# Patient Record
Sex: FEMALE | Race: WHITE | NOT HISPANIC OR LATINO | URBAN - METROPOLITAN AREA
[De-identification: names, ages, dates, MRNs, and addresses within clinical notes are randomized per-mention and may not be internally consistent; named-entity substitution may affect disease eponyms.]

---

## 2020-04-24 ENCOUNTER — LAB VISIT (OUTPATIENT)
Dept: LAB | Facility: HOSPITAL | Age: 85
End: 2020-04-24
Payer: MEDICARE

## 2020-04-24 DIAGNOSIS — Z20.822 SUSPECTED COVID-19 VIRUS INFECTION: ICD-10-CM

## 2020-04-24 PROCEDURE — U0002 COVID-19 LAB TEST NON-CDC: HCPCS

## 2020-04-25 LAB — SARS-COV-2 RNA RESP QL NAA+PROBE: DETECTED

## 2021-06-02 ENCOUNTER — LAB VISIT (OUTPATIENT)
Dept: LAB | Facility: OTHER | Age: 86
End: 2021-06-02
Payer: MEDICARE

## 2021-06-02 DIAGNOSIS — Z20.822 ENCOUNTER FOR LABORATORY TESTING FOR COVID-19 VIRUS: ICD-10-CM

## 2021-06-02 PROCEDURE — U0003 INFECTIOUS AGENT DETECTION BY NUCLEIC ACID (DNA OR RNA); SEVERE ACUTE RESPIRATORY SYNDROME CORONAVIRUS 2 (SARS-COV-2) (CORONAVIRUS DISEASE [COVID-19]), AMPLIFIED PROBE TECHNIQUE, MAKING USE OF HIGH THROUGHPUT TECHNOLOGIES AS DESCRIBED BY CMS-2020-01-R: HCPCS | Performed by: NURSE PRACTITIONER

## 2021-06-03 LAB — SARS-COV-2 RNA RESP QL NAA+PROBE: NOT DETECTED

## 2021-07-14 ENCOUNTER — LAB VISIT (OUTPATIENT)
Dept: LAB | Facility: OTHER | Age: 86
End: 2021-07-14
Payer: MEDICARE

## 2021-07-14 DIAGNOSIS — Z20.822 ENCOUNTER FOR LABORATORY TESTING FOR COVID-19 VIRUS: ICD-10-CM

## 2021-07-14 PROCEDURE — U0003 INFECTIOUS AGENT DETECTION BY NUCLEIC ACID (DNA OR RNA); SEVERE ACUTE RESPIRATORY SYNDROME CORONAVIRUS 2 (SARS-COV-2) (CORONAVIRUS DISEASE [COVID-19]), AMPLIFIED PROBE TECHNIQUE, MAKING USE OF HIGH THROUGHPUT TECHNOLOGIES AS DESCRIBED BY CMS-2020-01-R: HCPCS | Performed by: NURSE PRACTITIONER

## 2021-07-15 LAB
SARS-COV-2 RNA RESP QL NAA+PROBE: NOT DETECTED
SARS-COV-2- CYCLE NUMBER: -1

## 2021-07-21 ENCOUNTER — LAB VISIT (OUTPATIENT)
Dept: LAB | Facility: OTHER | Age: 86
End: 2021-07-21
Payer: MEDICARE

## 2021-07-21 DIAGNOSIS — Z20.822 ENCOUNTER FOR LABORATORY TESTING FOR COVID-19 VIRUS: ICD-10-CM

## 2021-07-21 PROCEDURE — U0003 INFECTIOUS AGENT DETECTION BY NUCLEIC ACID (DNA OR RNA); SEVERE ACUTE RESPIRATORY SYNDROME CORONAVIRUS 2 (SARS-COV-2) (CORONAVIRUS DISEASE [COVID-19]), AMPLIFIED PROBE TECHNIQUE, MAKING USE OF HIGH THROUGHPUT TECHNOLOGIES AS DESCRIBED BY CMS-2020-01-R: HCPCS | Performed by: NURSE PRACTITIONER

## 2021-07-23 LAB
SARS-COV-2 RNA RESP QL NAA+PROBE: NOT DETECTED
SARS-COV-2- CYCLE NUMBER: -1

## 2021-07-28 ENCOUNTER — LAB VISIT (OUTPATIENT)
Dept: LAB | Facility: OTHER | Age: 86
End: 2021-07-28
Payer: MEDICARE

## 2021-07-28 DIAGNOSIS — Z20.822 ENCOUNTER FOR LABORATORY TESTING FOR COVID-19 VIRUS: ICD-10-CM

## 2021-07-28 PROCEDURE — U0003 INFECTIOUS AGENT DETECTION BY NUCLEIC ACID (DNA OR RNA); SEVERE ACUTE RESPIRATORY SYNDROME CORONAVIRUS 2 (SARS-COV-2) (CORONAVIRUS DISEASE [COVID-19]), AMPLIFIED PROBE TECHNIQUE, MAKING USE OF HIGH THROUGHPUT TECHNOLOGIES AS DESCRIBED BY CMS-2020-01-R: HCPCS | Performed by: NURSE PRACTITIONER

## 2021-07-29 LAB
SARS-COV-2 RNA RESP QL NAA+PROBE: NOT DETECTED
SARS-COV-2- CYCLE NUMBER: -1

## 2021-09-08 ENCOUNTER — LAB VISIT (OUTPATIENT)
Dept: LAB | Facility: OTHER | Age: 86
End: 2021-09-08
Payer: MEDICARE

## 2021-09-08 DIAGNOSIS — Z20.822 ENCOUNTER FOR LABORATORY TESTING FOR COVID-19 VIRUS: ICD-10-CM

## 2021-09-08 PROCEDURE — U0003 INFECTIOUS AGENT DETECTION BY NUCLEIC ACID (DNA OR RNA); SEVERE ACUTE RESPIRATORY SYNDROME CORONAVIRUS 2 (SARS-COV-2) (CORONAVIRUS DISEASE [COVID-19]), AMPLIFIED PROBE TECHNIQUE, MAKING USE OF HIGH THROUGHPUT TECHNOLOGIES AS DESCRIBED BY CMS-2020-01-R: HCPCS | Performed by: NURSE PRACTITIONER

## 2021-09-09 LAB
SARS-COV-2 RNA RESP QL NAA+PROBE: NOT DETECTED
SARS-COV-2- CYCLE NUMBER: NORMAL

## 2021-09-15 ENCOUNTER — LAB VISIT (OUTPATIENT)
Dept: LAB | Facility: OTHER | Age: 86
End: 2021-09-15
Payer: MEDICARE

## 2021-09-15 DIAGNOSIS — Z20.822 ENCOUNTER FOR LABORATORY TESTING FOR COVID-19 VIRUS: ICD-10-CM

## 2021-09-15 PROCEDURE — U0003 INFECTIOUS AGENT DETECTION BY NUCLEIC ACID (DNA OR RNA); SEVERE ACUTE RESPIRATORY SYNDROME CORONAVIRUS 2 (SARS-COV-2) (CORONAVIRUS DISEASE [COVID-19]), AMPLIFIED PROBE TECHNIQUE, MAKING USE OF HIGH THROUGHPUT TECHNOLOGIES AS DESCRIBED BY CMS-2020-01-R: HCPCS | Performed by: NURSE PRACTITIONER

## 2021-09-16 LAB
SARS-COV-2 RNA RESP QL NAA+PROBE: NOT DETECTED
SARS-COV-2- CYCLE NUMBER: NORMAL

## 2021-09-22 ENCOUNTER — LAB VISIT (OUTPATIENT)
Dept: LAB | Facility: OTHER | Age: 86
End: 2021-09-22
Payer: MEDICARE

## 2021-09-22 DIAGNOSIS — Z20.822 ENCOUNTER FOR LABORATORY TESTING FOR COVID-19 VIRUS: ICD-10-CM

## 2021-09-22 PROCEDURE — U0003 INFECTIOUS AGENT DETECTION BY NUCLEIC ACID (DNA OR RNA); SEVERE ACUTE RESPIRATORY SYNDROME CORONAVIRUS 2 (SARS-COV-2) (CORONAVIRUS DISEASE [COVID-19]), AMPLIFIED PROBE TECHNIQUE, MAKING USE OF HIGH THROUGHPUT TECHNOLOGIES AS DESCRIBED BY CMS-2020-01-R: HCPCS | Performed by: NURSE PRACTITIONER

## 2021-09-23 LAB
SARS-COV-2 RNA RESP QL NAA+PROBE: NOT DETECTED
SARS-COV-2- CYCLE NUMBER: NORMAL

## 2021-12-01 ENCOUNTER — LAB VISIT (OUTPATIENT)
Dept: LAB | Facility: OTHER | Age: 86
End: 2021-12-01
Payer: MEDICARE

## 2021-12-01 DIAGNOSIS — Z20.822 ENCOUNTER FOR LABORATORY TESTING FOR COVID-19 VIRUS: ICD-10-CM

## 2021-12-01 LAB
SARS-COV-2 RNA RESP QL NAA+PROBE: NOT DETECTED
SARS-COV-2- CYCLE NUMBER: NORMAL

## 2021-12-01 PROCEDURE — U0003 INFECTIOUS AGENT DETECTION BY NUCLEIC ACID (DNA OR RNA); SEVERE ACUTE RESPIRATORY SYNDROME CORONAVIRUS 2 (SARS-COV-2) (CORONAVIRUS DISEASE [COVID-19]), AMPLIFIED PROBE TECHNIQUE, MAKING USE OF HIGH THROUGHPUT TECHNOLOGIES AS DESCRIBED BY CMS-2020-01-R: HCPCS | Performed by: NURSE PRACTITIONER

## 2021-12-22 ENCOUNTER — LAB VISIT (OUTPATIENT)
Dept: LAB | Facility: OTHER | Age: 86
End: 2021-12-22
Payer: MEDICARE

## 2021-12-22 DIAGNOSIS — Z20.822 ENCOUNTER FOR LABORATORY TESTING FOR COVID-19 VIRUS: ICD-10-CM

## 2021-12-22 LAB
SARS-COV-2 RNA RESP QL NAA+PROBE: NOT DETECTED
SARS-COV-2- CYCLE NUMBER: NORMAL

## 2021-12-22 PROCEDURE — U0003 INFECTIOUS AGENT DETECTION BY NUCLEIC ACID (DNA OR RNA); SEVERE ACUTE RESPIRATORY SYNDROME CORONAVIRUS 2 (SARS-COV-2) (CORONAVIRUS DISEASE [COVID-19]), AMPLIFIED PROBE TECHNIQUE, MAKING USE OF HIGH THROUGHPUT TECHNOLOGIES AS DESCRIBED BY CMS-2020-01-R: HCPCS | Performed by: NURSE PRACTITIONER

## 2021-12-29 ENCOUNTER — LAB VISIT (OUTPATIENT)
Dept: LAB | Facility: OTHER | Age: 86
End: 2021-12-29
Payer: MEDICARE

## 2021-12-29 DIAGNOSIS — Z20.822 ENCOUNTER FOR LABORATORY TESTING FOR COVID-19 VIRUS: ICD-10-CM

## 2021-12-29 PROCEDURE — U0003 INFECTIOUS AGENT DETECTION BY NUCLEIC ACID (DNA OR RNA); SEVERE ACUTE RESPIRATORY SYNDROME CORONAVIRUS 2 (SARS-COV-2) (CORONAVIRUS DISEASE [COVID-19]), AMPLIFIED PROBE TECHNIQUE, MAKING USE OF HIGH THROUGHPUT TECHNOLOGIES AS DESCRIBED BY CMS-2020-01-R: HCPCS | Performed by: NURSE PRACTITIONER

## 2021-12-31 LAB
SARS-COV-2 RNA RESP QL NAA+PROBE: NOT DETECTED
SARS-COV-2- CYCLE NUMBER: NORMAL

## 2022-07-25 ENCOUNTER — HOSPITAL ENCOUNTER (INPATIENT)
Facility: OTHER | Age: 87
LOS: 3 days | Discharge: HOSPICE | DRG: 871 | End: 2022-07-29
Attending: EMERGENCY MEDICINE | Admitting: INTERNAL MEDICINE
Payer: MEDICARE

## 2022-07-25 DIAGNOSIS — J96.01 ACUTE HYPOXEMIC RESPIRATORY FAILURE: ICD-10-CM

## 2022-07-25 DIAGNOSIS — R06.02 SOB (SHORTNESS OF BREATH): ICD-10-CM

## 2022-07-25 DIAGNOSIS — R07.9 CHEST PAIN: ICD-10-CM

## 2022-07-25 DIAGNOSIS — E86.0 DEHYDRATION: ICD-10-CM

## 2022-07-25 DIAGNOSIS — N17.9 AKI (ACUTE KIDNEY INJURY): ICD-10-CM

## 2022-07-25 DIAGNOSIS — E87.0 HYPERNATREMIA: Primary | ICD-10-CM

## 2022-07-25 PROBLEM — A41.9 SEPSIS: Status: ACTIVE | Noted: 2022-07-25

## 2022-07-25 PROBLEM — G93.41 ENCEPHALOPATHY, METABOLIC: Status: ACTIVE | Noted: 2022-07-25

## 2022-07-25 PROBLEM — J69.0 ASPIRATION PNEUMONIA: Status: ACTIVE | Noted: 2022-07-25

## 2022-07-25 PROBLEM — Z71.89 ACP (ADVANCE CARE PLANNING): Status: ACTIVE | Noted: 2022-07-25

## 2022-07-25 LAB
ALBUMIN SERPL BCP-MCNC: 3.6 G/DL (ref 3.5–5.2)
ALP SERPL-CCNC: 86 U/L (ref 55–135)
ALT SERPL W/O P-5'-P-CCNC: 26 U/L (ref 10–44)
ANION GAP SERPL CALC-SCNC: 20 MMOL/L (ref 8–16)
AST SERPL-CCNC: 37 U/L (ref 10–40)
BASOPHILS # BLD AUTO: 0.06 K/UL (ref 0–0.2)
BASOPHILS NFR BLD: 0.3 % (ref 0–1.9)
BILIRUB SERPL-MCNC: 0.5 MG/DL (ref 0.1–1)
BILIRUB UR QL STRIP: NEGATIVE
BNP SERPL-MCNC: 158 PG/ML (ref 0–99)
BUN SERPL-MCNC: 109 MG/DL (ref 10–30)
CALCIUM SERPL-MCNC: 9.3 MG/DL (ref 8.7–10.5)
CHLORIDE SERPL-SCNC: 125 MMOL/L (ref 95–110)
CLARITY UR: CLEAR
CO2 SERPL-SCNC: 11 MMOL/L (ref 23–29)
COLOR UR: YELLOW
CREAT SERPL-MCNC: 2.7 MG/DL (ref 0.5–1.4)
CTP QC/QA: YES
DIFFERENTIAL METHOD: ABNORMAL
EOSINOPHIL # BLD AUTO: 0 K/UL (ref 0–0.5)
EOSINOPHIL NFR BLD: 0 % (ref 0–8)
ERYTHROCYTE [DISTWIDTH] IN BLOOD BY AUTOMATED COUNT: 15.2 % (ref 11.5–14.5)
EST. GFR  (AFRICAN AMERICAN): 16 ML/MIN/1.73 M^2
EST. GFR  (NON AFRICAN AMERICAN): 14 ML/MIN/1.73 M^2
FIO2: 100
GLUCOSE SERPL-MCNC: 154 MG/DL (ref 70–110)
GLUCOSE UR QL STRIP: NEGATIVE
HCO3 UR-SCNC: 16 MMOL/L (ref 24–28)
HCT VFR BLD AUTO: 40.8 % (ref 37–48.5)
HCT VFR BLD CALC: 31 %PCV (ref 36–54)
HGB BLD-MCNC: 11 G/DL
HGB BLD-MCNC: 12.6 G/DL (ref 12–16)
HGB UR QL STRIP: NEGATIVE
IMM GRANULOCYTES # BLD AUTO: 0.11 K/UL (ref 0–0.04)
IMM GRANULOCYTES NFR BLD AUTO: 0.5 % (ref 0–0.5)
KETONES UR QL STRIP: NEGATIVE
LACTATE SERPL-SCNC: 2.8 MMOL/L (ref 0.5–2.2)
LACTATE SERPL-SCNC: 5.2 MMOL/L (ref 0.5–2.2)
LEUKOCYTE ESTERASE UR QL STRIP: NEGATIVE
LYMPHOCYTES # BLD AUTO: 0.6 K/UL (ref 1–4.8)
LYMPHOCYTES NFR BLD: 2.9 % (ref 18–48)
MCH RBC QN AUTO: 32.6 PG (ref 27–31)
MCHC RBC AUTO-ENTMCNC: 30.9 G/DL (ref 32–36)
MCV RBC AUTO: 105 FL (ref 82–98)
MONOCYTES # BLD AUTO: 0.6 K/UL (ref 0.3–1)
MONOCYTES NFR BLD: 2.9 % (ref 4–15)
NEUTROPHILS # BLD AUTO: 20.3 K/UL (ref 1.8–7.7)
NEUTROPHILS NFR BLD: 93.4 % (ref 38–73)
NITRITE UR QL STRIP: NEGATIVE
NRBC BLD-RTO: 0 /100 WBC
PCO2 BLDA: 37.2 MMHG (ref 35–45)
PH SMN: 7.24 [PH] (ref 7.35–7.45)
PH UR STRIP: 5 [PH] (ref 5–8)
PLATELET # BLD AUTO: 206 K/UL (ref 150–450)
PLATELET BLD QL SMEAR: ABNORMAL
PMV BLD AUTO: 13.4 FL (ref 9.2–12.9)
PO2 BLDA: 121 MMHG (ref 40–60)
POC BE: -11 MMOL/L
POC IONIZED CALCIUM: 1.27 MMOL/L (ref 1.06–1.42)
POC PCO2 TEMP: 37.2 MMHG
POC PH TEMP: 7.24
POC PO2 TEMP: 121 MMHG
POC SATURATED O2: 98 % (ref 95–100)
POC TCO2: 17 MMOL/L (ref 24–29)
POC TEMPERATURE: ABNORMAL
POTASSIUM BLD-SCNC: 4.5 MMOL/L (ref 3.5–5.1)
POTASSIUM SERPL-SCNC: 4.9 MMOL/L (ref 3.5–5.1)
PROT SERPL-MCNC: 7.5 G/DL (ref 6–8.4)
PROT UR QL STRIP: ABNORMAL
RBC # BLD AUTO: 3.87 M/UL (ref 4–5.4)
SAMPLE: ABNORMAL
SARS-COV-2 RDRP RESP QL NAA+PROBE: NEGATIVE
SODIUM BLD-SCNC: 153 MMOL/L (ref 136–145)
SODIUM SERPL-SCNC: 156 MMOL/L (ref 136–145)
SP GR UR STRIP: 1.02 (ref 1–1.03)
TROPONIN I SERPL DL<=0.01 NG/ML-MCNC: 0.11 NG/ML (ref 0–0.03)
URN SPEC COLLECT METH UR: ABNORMAL
UROBILINOGEN UR STRIP-ACNC: NEGATIVE EU/DL
WBC # BLD AUTO: 21.74 K/UL (ref 3.9–12.7)

## 2022-07-25 PROCEDURE — 80053 COMPREHEN METABOLIC PANEL: CPT | Performed by: EMERGENCY MEDICINE

## 2022-07-25 PROCEDURE — P9612 CATHETERIZE FOR URINE SPEC: HCPCS

## 2022-07-25 PROCEDURE — 82803 BLOOD GASES ANY COMBINATION: CPT

## 2022-07-25 PROCEDURE — 85025 COMPLETE CBC W/AUTO DIFF WBC: CPT | Performed by: EMERGENCY MEDICINE

## 2022-07-25 PROCEDURE — 83880 ASSAY OF NATRIURETIC PEPTIDE: CPT | Performed by: EMERGENCY MEDICINE

## 2022-07-25 PROCEDURE — 93005 ELECTROCARDIOGRAM TRACING: CPT

## 2022-07-25 PROCEDURE — 83605 ASSAY OF LACTIC ACID: CPT | Performed by: NURSE PRACTITIONER

## 2022-07-25 PROCEDURE — 25000003 PHARM REV CODE 250: Performed by: NURSE PRACTITIONER

## 2022-07-25 PROCEDURE — 93010 ELECTROCARDIOGRAM REPORT: CPT | Mod: ,,, | Performed by: INTERNAL MEDICINE

## 2022-07-25 PROCEDURE — 99291 CRITICAL CARE FIRST HOUR: CPT | Mod: 25

## 2022-07-25 PROCEDURE — 27000221 HC OXYGEN, UP TO 24 HOURS

## 2022-07-25 PROCEDURE — 36600 WITHDRAWAL OF ARTERIAL BLOOD: CPT

## 2022-07-25 PROCEDURE — 94761 N-INVAS EAR/PLS OXIMETRY MLT: CPT

## 2022-07-25 PROCEDURE — U0002 COVID-19 LAB TEST NON-CDC: HCPCS | Performed by: EMERGENCY MEDICINE

## 2022-07-25 PROCEDURE — 36415 COLL VENOUS BLD VENIPUNCTURE: CPT | Performed by: NURSE PRACTITIONER

## 2022-07-25 PROCEDURE — 87040 BLOOD CULTURE FOR BACTERIA: CPT | Performed by: EMERGENCY MEDICINE

## 2022-07-25 PROCEDURE — G0378 HOSPITAL OBSERVATION PER HR: HCPCS

## 2022-07-25 PROCEDURE — 96365 THER/PROPH/DIAG IV INF INIT: CPT

## 2022-07-25 PROCEDURE — 81003 URINALYSIS AUTO W/O SCOPE: CPT | Performed by: EMERGENCY MEDICINE

## 2022-07-25 PROCEDURE — 25000003 PHARM REV CODE 250: Performed by: EMERGENCY MEDICINE

## 2022-07-25 PROCEDURE — 63600175 PHARM REV CODE 636 W HCPCS: Performed by: EMERGENCY MEDICINE

## 2022-07-25 PROCEDURE — 93010 EKG 12-LEAD: ICD-10-PCS | Mod: ,,, | Performed by: INTERNAL MEDICINE

## 2022-07-25 PROCEDURE — 87086 URINE CULTURE/COLONY COUNT: CPT | Performed by: EMERGENCY MEDICINE

## 2022-07-25 PROCEDURE — 84484 ASSAY OF TROPONIN QUANT: CPT | Performed by: EMERGENCY MEDICINE

## 2022-07-25 PROCEDURE — 83605 ASSAY OF LACTIC ACID: CPT | Mod: 91 | Performed by: EMERGENCY MEDICINE

## 2022-07-25 PROCEDURE — 96375 TX/PRO/DX INJ NEW DRUG ADDON: CPT

## 2022-07-25 PROCEDURE — 96361 HYDRATE IV INFUSION ADD-ON: CPT

## 2022-07-25 RX ORDER — NALOXONE HCL 0.4 MG/ML
0.02 VIAL (ML) INJECTION
Status: DISCONTINUED | OUTPATIENT
Start: 2022-07-25 | End: 2022-07-29 | Stop reason: HOSPADM

## 2022-07-25 RX ORDER — GLUCAGON 1 MG
1 KIT INJECTION
Status: DISCONTINUED | OUTPATIENT
Start: 2022-07-25 | End: 2022-07-29 | Stop reason: HOSPADM

## 2022-07-25 RX ORDER — LANOLIN ALCOHOL/MO/W.PET/CERES
800 CREAM (GRAM) TOPICAL
Status: DISCONTINUED | OUTPATIENT
Start: 2022-07-25 | End: 2022-07-29 | Stop reason: HOSPADM

## 2022-07-25 RX ORDER — ACETAMINOPHEN 325 MG/1
650 TABLET ORAL EVERY 8 HOURS PRN
Status: DISCONTINUED | OUTPATIENT
Start: 2022-07-25 | End: 2022-07-29 | Stop reason: HOSPADM

## 2022-07-25 RX ORDER — SODIUM CHLORIDE 9 MG/ML
INJECTION, SOLUTION INTRAVENOUS CONTINUOUS
Status: DISCONTINUED | OUTPATIENT
Start: 2022-07-25 | End: 2022-07-26

## 2022-07-25 RX ORDER — SODIUM,POTASSIUM PHOSPHATES 280-250MG
2 POWDER IN PACKET (EA) ORAL
Status: DISCONTINUED | OUTPATIENT
Start: 2022-07-25 | End: 2022-07-25

## 2022-07-25 RX ORDER — CLOPIDOGREL BISULFATE 75 MG/1
75 TABLET ORAL DAILY
Status: DISCONTINUED | OUTPATIENT
Start: 2022-07-26 | End: 2022-07-25

## 2022-07-25 RX ORDER — ONDANSETRON 2 MG/ML
4 INJECTION INTRAMUSCULAR; INTRAVENOUS EVERY 8 HOURS PRN
Status: DISCONTINUED | OUTPATIENT
Start: 2022-07-25 | End: 2022-07-29 | Stop reason: HOSPADM

## 2022-07-25 RX ORDER — DOCUSATE SODIUM 100 MG/1
100 CAPSULE, LIQUID FILLED ORAL DAILY
Status: DISCONTINUED | OUTPATIENT
Start: 2022-07-26 | End: 2022-07-25

## 2022-07-25 RX ORDER — MAG HYDROX/ALUMINUM HYD/SIMETH 200-200-20
30 SUSPENSION, ORAL (FINAL DOSE FORM) ORAL 4 TIMES DAILY PRN
Status: DISCONTINUED | OUTPATIENT
Start: 2022-07-25 | End: 2022-07-29 | Stop reason: HOSPADM

## 2022-07-25 RX ORDER — SODIUM CHLORIDE 0.9 % (FLUSH) 0.9 %
10 SYRINGE (ML) INJECTION EVERY 8 HOURS PRN
Status: DISCONTINUED | OUTPATIENT
Start: 2022-07-25 | End: 2022-07-29 | Stop reason: HOSPADM

## 2022-07-25 RX ORDER — CEFEPIME HYDROCHLORIDE 1 G/50ML
2 INJECTION, SOLUTION INTRAVENOUS
Status: COMPLETED | OUTPATIENT
Start: 2022-07-25 | End: 2022-07-25

## 2022-07-25 RX ORDER — HYDROCODONE BITARTRATE AND ACETAMINOPHEN 5; 325 MG/1; MG/1
1 TABLET ORAL 2 TIMES DAILY PRN
Status: DISCONTINUED | OUTPATIENT
Start: 2022-07-25 | End: 2022-07-29 | Stop reason: HOSPADM

## 2022-07-25 RX ORDER — AMOXICILLIN 250 MG
1 CAPSULE ORAL DAILY PRN
Status: DISCONTINUED | OUTPATIENT
Start: 2022-07-25 | End: 2022-07-29 | Stop reason: HOSPADM

## 2022-07-25 RX ORDER — ENOXAPARIN SODIUM 100 MG/ML
40 INJECTION SUBCUTANEOUS EVERY 24 HOURS
Status: DISCONTINUED | OUTPATIENT
Start: 2022-07-25 | End: 2022-07-25

## 2022-07-25 RX ORDER — TALC
6 POWDER (GRAM) TOPICAL NIGHTLY PRN
Status: DISCONTINUED | OUTPATIENT
Start: 2022-07-25 | End: 2022-07-29 | Stop reason: HOSPADM

## 2022-07-25 RX ORDER — PROCHLORPERAZINE EDISYLATE 5 MG/ML
5 INJECTION INTRAMUSCULAR; INTRAVENOUS EVERY 6 HOURS PRN
Status: DISCONTINUED | OUTPATIENT
Start: 2022-07-25 | End: 2022-07-29 | Stop reason: HOSPADM

## 2022-07-25 RX ORDER — IBUPROFEN 200 MG
16 TABLET ORAL
Status: DISCONTINUED | OUTPATIENT
Start: 2022-07-25 | End: 2022-07-29 | Stop reason: HOSPADM

## 2022-07-25 RX ORDER — IPRATROPIUM BROMIDE AND ALBUTEROL SULFATE 2.5; .5 MG/3ML; MG/3ML
3 SOLUTION RESPIRATORY (INHALATION) EVERY 6 HOURS PRN
Status: DISCONTINUED | OUTPATIENT
Start: 2022-07-25 | End: 2022-07-29 | Stop reason: HOSPADM

## 2022-07-25 RX ORDER — IBUPROFEN 200 MG
24 TABLET ORAL
Status: DISCONTINUED | OUTPATIENT
Start: 2022-07-25 | End: 2022-07-29 | Stop reason: HOSPADM

## 2022-07-25 RX ORDER — SIMETHICONE 80 MG
1 TABLET,CHEWABLE ORAL 4 TIMES DAILY PRN
Status: DISCONTINUED | OUTPATIENT
Start: 2022-07-25 | End: 2022-07-29 | Stop reason: HOSPADM

## 2022-07-25 RX ORDER — VANCOMYCIN HCL IN 5 % DEXTROSE 1G/250ML
1000 PLASTIC BAG, INJECTION (ML) INTRAVENOUS
Status: COMPLETED | OUTPATIENT
Start: 2022-07-25 | End: 2022-07-25

## 2022-07-25 RX ORDER — ONDANSETRON 2 MG/ML
4 INJECTION INTRAMUSCULAR; INTRAVENOUS
Status: ACTIVE | OUTPATIENT
Start: 2022-07-25 | End: 2022-07-26

## 2022-07-25 RX ORDER — CEFEPIME HYDROCHLORIDE 1 G/50ML
2 INJECTION, SOLUTION INTRAVENOUS
Status: DISCONTINUED | OUTPATIENT
Start: 2022-07-25 | End: 2022-07-25 | Stop reason: DRUGHIGH

## 2022-07-25 RX ORDER — SODIUM CHLORIDE 9 MG/ML
500 INJECTION, SOLUTION INTRAVENOUS
Status: COMPLETED | OUTPATIENT
Start: 2022-07-25 | End: 2022-07-25

## 2022-07-25 RX ORDER — NAPROXEN SODIUM 220 MG/1
81 TABLET, FILM COATED ORAL DAILY
Status: DISCONTINUED | OUTPATIENT
Start: 2022-07-26 | End: 2022-07-25

## 2022-07-25 RX ORDER — ISOSORBIDE MONONITRATE 30 MG/1
30 TABLET, EXTENDED RELEASE ORAL DAILY
Status: DISCONTINUED | OUTPATIENT
Start: 2022-07-26 | End: 2022-07-25

## 2022-07-25 RX ADMIN — SODIUM CHLORIDE: 0.9 INJECTION, SOLUTION INTRAVENOUS at 11:07

## 2022-07-25 RX ADMIN — VANCOMYCIN HYDROCHLORIDE 1000 MG: 1 INJECTION, POWDER, LYOPHILIZED, FOR SOLUTION INTRAVENOUS at 08:07

## 2022-07-25 RX ADMIN — SODIUM CHLORIDE 500 ML: 0.9 INJECTION, SOLUTION INTRAVENOUS at 07:07

## 2022-07-25 RX ADMIN — CEFEPIME HYDROCHLORIDE 2 G: 2 INJECTION, SOLUTION INTRAVENOUS at 07:07

## 2022-07-25 RX ADMIN — SODIUM BICARBONATE: 84 INJECTION, SOLUTION INTRAVENOUS at 11:07

## 2022-07-25 RX ADMIN — SODIUM CHLORIDE 1000 ML: 0.9 INJECTION, SOLUTION INTRAVENOUS at 07:07

## 2022-07-25 NOTE — ED PROVIDER NOTES
Encounter Date: 7/25/2022    SCRIBE #1 NOTE: I, Isi Sharp, am scribing for, and in the presence of, Nat Prater MD.       History     Chief Complaint   Patient presents with    Shortness of Breath     Sent from nursing home for SOB, back pain and agitation. Diagnosed with COVID 1 month ago. Given 50 mg ketamine IV per EMS pta. dui    Altered Mental Status     Pt started to have diarrhea and became altered. today Per EMS pt was hypoxic in the 60s and was given assisted ventilation with the BVM. Pulse detected. Pt was not responsive to stimulation. Pt improved O2 >93% with 15L NRB. Blood glucose normal with EMS. DNR.     Diarrhea     Time seen by provider: 4:46 PM    This is a 89 y.o. female with PMHx of carotid artery occlusion, aortic insufficiency, CKD, and prior MI, who presents from her nursing home via EMS with SOB. Patient also has altered mental status. Per EMS, the patient's SpO2 was 60% when they arrived. Her CBG was 199. The patient was noncompliant with EMS, so they administered 50 mg of ketamine IV. Of note, patient was diagnosed with COVID 1 month ago; she tested negative for COVID yesterday. Nursing home reports that the patient has had diarrhea that began today.    This is the extent of the patient's complaints at this time.    The history is provided by the EMS personnel and the nursing home. The history is limited by the condition of the patient.     Review of patient's allergies indicates:  No Known Allergies  Past Medical History:   Diagnosis Date    Aortic insufficiency     Carotid artery occlusion     CKD (chronic kidney disease)     MI (myocardial infarction)      No past surgical history on file.  No family history on file.  Social History     Tobacco Use    Smoking status: Never Smoker   Substance Use Topics    Alcohol use: No    Drug use: No     Review of Systems   Unable to perform ROS: Mental status change       Physical Exam     Initial Vitals   BP Pulse Resp Temp SpO2    07/25/22 1652 07/25/22 1652 07/25/22 1652 07/25/22 1848 07/25/22 1652   (!) 133/32 85 20 96.4 °F (35.8 °C) 99 %      MAP       --                Physical Exam    Nursing note and vitals reviewed.  Constitutional: She appears well-developed.   HENT:   Head: Normocephalic and atraumatic.   Eyes: Conjunctivae are normal.   Neck: Neck supple.   Normal range of motion.  Cardiovascular: Normal rate, regular rhythm and normal heart sounds.   2+ DP/PT pulses.   Pulmonary/Chest: She has no wheezes. She has rhonchi. She has no rales.   Rhonchi diffusely.    Abdominal: Abdomen is soft. She exhibits no distension. There is no abdominal tenderness.   Musculoskeletal:         General: No edema.      Cervical back: Normal range of motion and neck supple.     Neurological:   Opens eyes to stimuli.  Unable to follow commands.  Unable to answer questions.   Skin: Skin is warm and dry. Capillary refill takes less than 2 seconds.         ED Course   Critical Care    Date/Time: 7/25/2022 9:05 PM  Performed by: Nat Prater MD  Authorized by: Nat Prater MD   Direct patient critical care time: 10 minutes  Additional history critical care time: 5 minutes  Ordering / reviewing critical care time: 5 minutes  Documentation critical care time: 5 minutes  Consulting other physicians critical care time: 5 minutes  Total critical care time (exclusive of procedural time) : 30 minutes  Critical care time was exclusive of separately billable procedures and treating other patients and teaching time.  Critical care was necessary to treat or prevent imminent or life-threatening deterioration of the following conditions: metabolic crisis, renal failure and dehydration.  Critical care was time spent personally by me on the following activities: evaluation of patient's response to treatment, obtaining history from patient or surrogate, ordering and review of laboratory studies, pulse oximetry, review of old charts, re-evaluation of patient's  condition, ordering and review of radiographic studies, ordering and performing treatments and interventions, examination of patient and development of treatment plan with patient or surrogate.        Labs Reviewed   CBC W/ AUTO DIFFERENTIAL - Abnormal; Notable for the following components:       Result Value    WBC 21.74 (*)     RBC 3.87 (*)      (*)     MCH 32.6 (*)     MCHC 30.9 (*)     RDW 15.2 (*)     MPV 13.4 (*)     Gran # (ANC) 20.3 (*)     Immature Grans (Abs) 0.11 (*)     Lymph # 0.6 (*)     Gran % 93.4 (*)     Lymph % 2.9 (*)     Mono % 2.9 (*)     All other components within normal limits   LACTIC ACID, PLASMA - Abnormal; Notable for the following components:    Lactate (Lactic Acid) 5.2 (*)     All other components within normal limits    Narrative:      LA  critical result(s) called and verbal readback obtained from   Joel DEL CID RN. by TK4 07/25/2022 20:30   COMPREHENSIVE METABOLIC PANEL - Abnormal; Notable for the following components:    Sodium 156 (*)     Chloride 125 (*)     CO2 11 (*)     Glucose 154 (*)      (*)     Creatinine 2.7 (*)     Anion Gap 20 (*)     eGFR if  16 (*)     eGFR if non  14 (*)     All other components within normal limits   B-TYPE NATRIURETIC PEPTIDE - Abnormal; Notable for the following components:     (*)     All other components within normal limits   TROPONIN I - Abnormal; Notable for the following components:    Troponin I 0.112 (*)     All other components within normal limits   URINALYSIS - Abnormal; Notable for the following components:    Protein, UA Trace (*)     All other components within normal limits   CULTURE, BLOOD   CULTURE, BLOOD   CULTURE, URINE   SARS-COV-2 RDRP GENE     EKG Readings: (Independently Interpreted)   17:15  Rate of 71. Normal sinus rhythm. Normal intervals. Normal axis. No ST or ischemic changes.        Imaging Results          X-Ray Chest AP Portable (Final result)  Result time 07/25/22  18:37:08    Final result by Ruth Duarte MD (07/25/22 18:37:08)                 Narrative:    EXAMINATION:  AP PORTABLE CHEST    CLINICAL HISTORY:  Shortness of breath    TECHNIQUE:  AP portable chest radiograph was submitted.    COMPARISON:  None.    FINDINGS:  AP portable chest radiograph demonstrates enlargement of the cardiac silhouette.  Vascular calcification is seen at the aortic knob..  There is tracheal deviation to the right.  There are prominent lung markings, which may be chronic.  There is no focal consolidation, pneumothorax, or pleural effusion.  There is remote fracture of the left clavicle.  Bones are diffusely osteopenic.    IMPRESSION  Enlargement of the cardiac silhouette.  No focal consolidation.      Electronically signed by: Ruth Duarte  Date:    07/25/2022  Time:    18:37                            X-Rays:   Independently Interpreted Readings:   Chest X-Ray: Trachea midline. Diffuse coarse interstitial markings.      Medications   ondansetron injection 4 mg (4 mg Intravenous Not Given 7/25/22 1730)   vancomycin in dextrose 5 % 1 gram/250 mL IVPB 1,000 mg (1,000 mg Intravenous New Bag 7/25/22 2042)   aspirin chewable tablet 81 mg (has no administration in time range)   HYDROcodone-acetaminophen 5-325 mg per tablet 1 tablet (has no administration in time range)   isosorbide mononitrate 24 hr tablet 30 mg (has no administration in time range)   metoprolol succinate (TOPROL-XL) 24 hr split tablet 12.5 mg (has no administration in time range)   sodium chloride 0.9% flush 10 mL (has no administration in time range)   albuterol-ipratropium 2.5 mg-0.5 mg/3 mL nebulizer solution 3 mL (has no administration in time range)   melatonin tablet 6 mg (has no administration in time range)   ondansetron injection 4 mg (has no administration in time range)   prochlorperazine injection Soln 5 mg (has no administration in time range)   senna-docusate 8.6-50 mg per tablet 1 tablet (has no  administration in time range)   acetaminophen tablet 650 mg (has no administration in time range)   simethicone chewable tablet 80 mg (has no administration in time range)   aluminum-magnesium hydroxide-simethicone 200-200-20 mg/5 mL suspension 30 mL (has no administration in time range)   naloxone 0.4 mg/mL injection 0.02 mg (has no administration in time range)   potassium bicarbonate disintegrating tablet 50 mEq (has no administration in time range)   potassium bicarbonate disintegrating tablet 35 mEq (has no administration in time range)   potassium bicarbonate disintegrating tablet 60 mEq (has no administration in time range)   magnesium oxide tablet 800 mg (has no administration in time range)   magnesium oxide tablet 800 mg (has no administration in time range)   potassium, sodium phosphates 280-160-250 mg packet 2 packet (has no administration in time range)   potassium, sodium phosphates 280-160-250 mg packet 2 packet (has no administration in time range)   potassium, sodium phosphates 280-160-250 mg packet 2 packet (has no administration in time range)   glucose chewable tablet 16 g (has no administration in time range)   glucose chewable tablet 24 g (has no administration in time range)   glucagon (human recombinant) injection 1 mg (has no administration in time range)   0.9%  NaCl infusion (has no administration in time range)   cefepime in dextrose 5 % IVPB 2 g (0 g Intravenous Stopped 7/25/22 2019)   0.9%  NaCl infusion (500 mLs Intravenous New Bag 7/25/22 1954)   sodium chloride 0.9% bolus 1,000 mL (1,000 mLs Intravenous New Bag 7/25/22 1954)     Medical Decision Making:   History:   Old Medical Records: I decided to obtain old medical records.  Old Records Summarized: other records and records from clinic visits.  Initial Assessment:   4:46PM:  Patient is a 1 0 3-year-old female who presents to the emergency department with altered mental status, shortness of breath, was found to be very hypoxic.   Patient does have decreased mental status though unclear what her baseline is.  She does arouse to stimuli but unable to answer questions or follow commands.  Will plan for sepsis evaluation, labs, cultures, IV fluids, will continue to follow and reassess.    Independently Interpreted Test(s):   I have ordered and independently interpreted X-rays - see prior notes.  I have ordered and independently interpreted EKG Reading(s) - see prior notes  Clinical Tests:   Lab Tests: Ordered and Reviewed  Radiological Study: Ordered and Reviewed  Medical Tests: Ordered and Reviewed    5:05PM:  Patient's blood pressure dropped to the 70s systolic.  She does seem to be fluid responsive.  Will continue to follow.    8:32PM:  Patient's sodium and chloride level are very elevated with an elevated BUN and creatinine as well.  Her white blood cell count is 21. Patient is likely very dehydrated.  Will plan to admit for further observation and management.  I discussed the patient with Columba Smith PA-C, who is agreeable to plan for admission and all questions answered.          Scribe Attestation:   Scribe #1: I performed the above scribed service and the documentation accurately describes the services I performed. I attest to the accuracy of the note.              Physician Attestation for Scribe: I, Nat Prater, reviewed documentation as scribed in my presence, which is both accurate and complete.      Clinical Impression:   Final diagnoses:  [R06.02] SOB (shortness of breath)  [N17.9] MICHAEL (acute kidney injury)  [E86.0] Dehydration  [E87.0] Hypernatremia (Primary)          ED Disposition Condition    Observation               Nat Prater MD  07/25/22 1972

## 2022-07-26 PROBLEM — E87.0 HYPERNATREMIA: Status: ACTIVE | Noted: 2022-07-26

## 2022-07-26 PROBLEM — K92.2 ACUTE GI BLEEDING: Status: ACTIVE | Noted: 2022-07-26

## 2022-07-26 LAB
ALBUMIN SERPL BCP-MCNC: 2.6 G/DL (ref 3.5–5.2)
ALP SERPL-CCNC: 63 U/L (ref 55–135)
ALT SERPL W/O P-5'-P-CCNC: 17 U/L (ref 10–44)
ANION GAP SERPL CALC-SCNC: 13 MMOL/L (ref 8–16)
ANION GAP SERPL CALC-SCNC: 13 MMOL/L (ref 8–16)
AST SERPL-CCNC: 26 U/L (ref 10–40)
BASOPHILS # BLD AUTO: 0.03 K/UL (ref 0–0.2)
BASOPHILS NFR BLD: 0.2 % (ref 0–1.9)
BILIRUB SERPL-MCNC: 0.4 MG/DL (ref 0.1–1)
BUN SERPL-MCNC: 80 MG/DL (ref 10–30)
BUN SERPL-MCNC: 93 MG/DL (ref 10–30)
C DIFF GDH STL QL: NEGATIVE
C DIFF TOX A+B STL QL IA: NEGATIVE
CALCIUM SERPL-MCNC: 7.6 MG/DL (ref 8.7–10.5)
CALCIUM SERPL-MCNC: 8.5 MG/DL (ref 8.7–10.5)
CHLORIDE SERPL-SCNC: 122 MMOL/L (ref 95–110)
CHLORIDE SERPL-SCNC: 125 MMOL/L (ref 95–110)
CO2 SERPL-SCNC: 16 MMOL/L (ref 23–29)
CO2 SERPL-SCNC: 21 MMOL/L (ref 23–29)
CREAT SERPL-MCNC: 2 MG/DL (ref 0.5–1.4)
CREAT SERPL-MCNC: 2.2 MG/DL (ref 0.5–1.4)
DIFFERENTIAL METHOD: ABNORMAL
EOSINOPHIL # BLD AUTO: 0 K/UL (ref 0–0.5)
EOSINOPHIL NFR BLD: 0 % (ref 0–8)
ERYTHROCYTE [DISTWIDTH] IN BLOOD BY AUTOMATED COUNT: 14.7 % (ref 11.5–14.5)
EST. GFR  (AFRICAN AMERICAN): 20 ML/MIN/1.73 M^2
EST. GFR  (AFRICAN AMERICAN): 23 ML/MIN/1.73 M^2
EST. GFR  (NON AFRICAN AMERICAN): 17 ML/MIN/1.73 M^2
EST. GFR  (NON AFRICAN AMERICAN): 20 ML/MIN/1.73 M^2
GLUCOSE SERPL-MCNC: 154 MG/DL (ref 70–110)
GLUCOSE SERPL-MCNC: 181 MG/DL (ref 70–110)
HCT VFR BLD AUTO: 30 % (ref 37–48.5)
HGB BLD-MCNC: 9.3 G/DL (ref 12–16)
IMM GRANULOCYTES # BLD AUTO: 0.05 K/UL (ref 0–0.04)
IMM GRANULOCYTES NFR BLD AUTO: 0.3 % (ref 0–0.5)
LACTATE SERPL-SCNC: 2.3 MMOL/L (ref 0.5–2.2)
LACTATE SERPL-SCNC: 2.7 MMOL/L (ref 0.5–2.2)
LYMPHOCYTES # BLD AUTO: 0.8 K/UL (ref 1–4.8)
LYMPHOCYTES NFR BLD: 5.3 % (ref 18–48)
MAGNESIUM SERPL-MCNC: 2 MG/DL (ref 1.6–2.6)
MCH RBC QN AUTO: 32 PG (ref 27–31)
MCHC RBC AUTO-ENTMCNC: 31 G/DL (ref 32–36)
MCV RBC AUTO: 103 FL (ref 82–98)
MONOCYTES # BLD AUTO: 0.5 K/UL (ref 0.3–1)
MONOCYTES NFR BLD: 3.2 % (ref 4–15)
NEUTROPHILS # BLD AUTO: 14.3 K/UL (ref 1.8–7.7)
NEUTROPHILS NFR BLD: 91 % (ref 38–73)
NRBC BLD-RTO: 0 /100 WBC
PHOSPHATE SERPL-MCNC: 4.2 MG/DL (ref 2.7–4.5)
PLATELET # BLD AUTO: 156 K/UL (ref 150–450)
PMV BLD AUTO: 12.6 FL (ref 9.2–12.9)
POTASSIUM SERPL-SCNC: 3.3 MMOL/L (ref 3.5–5.1)
POTASSIUM SERPL-SCNC: 4.1 MMOL/L (ref 3.5–5.1)
PROT SERPL-MCNC: 5.3 G/DL (ref 6–8.4)
RBC # BLD AUTO: 2.91 M/UL (ref 4–5.4)
SODIUM SERPL-SCNC: 154 MMOL/L (ref 136–145)
SODIUM SERPL-SCNC: 156 MMOL/L (ref 136–145)
VANCOMYCIN SERPL-MCNC: 12.7 UG/ML
WBC # BLD AUTO: 15.76 K/UL (ref 3.9–12.7)

## 2022-07-26 PROCEDURE — 87449 NOS EACH ORGANISM AG IA: CPT | Performed by: INTERNAL MEDICINE

## 2022-07-26 PROCEDURE — 84100 ASSAY OF PHOSPHORUS: CPT | Performed by: NURSE PRACTITIONER

## 2022-07-26 PROCEDURE — 82272 OCCULT BLD FECES 1-3 TESTS: CPT | Performed by: INTERNAL MEDICINE

## 2022-07-26 PROCEDURE — 36415 COLL VENOUS BLD VENIPUNCTURE: CPT | Performed by: NURSE PRACTITIONER

## 2022-07-26 PROCEDURE — 83605 ASSAY OF LACTIC ACID: CPT | Mod: 91 | Performed by: INTERNAL MEDICINE

## 2022-07-26 PROCEDURE — 99223 PR INITIAL HOSPITAL CARE,LEVL III: ICD-10-PCS | Mod: ,,, | Performed by: FAMILY MEDICINE

## 2022-07-26 PROCEDURE — 25000003 PHARM REV CODE 250: Performed by: NURSE PRACTITIONER

## 2022-07-26 PROCEDURE — 99497 ADVNCD CARE PLAN 30 MIN: CPT | Mod: 25,,, | Performed by: FAMILY MEDICINE

## 2022-07-26 PROCEDURE — S5010 5% DEXTROSE AND 0.45% SALINE: HCPCS | Performed by: INTERNAL MEDICINE

## 2022-07-26 PROCEDURE — 80202 ASSAY OF VANCOMYCIN: CPT | Performed by: INTERNAL MEDICINE

## 2022-07-26 PROCEDURE — 99497 PR ADVNCD CARE PLAN 30 MIN: ICD-10-PCS | Mod: 25,,, | Performed by: FAMILY MEDICINE

## 2022-07-26 PROCEDURE — C9113 INJ PANTOPRAZOLE SODIUM, VIA: HCPCS | Performed by: INTERNAL MEDICINE

## 2022-07-26 PROCEDURE — 83735 ASSAY OF MAGNESIUM: CPT | Performed by: NURSE PRACTITIONER

## 2022-07-26 PROCEDURE — 99223 1ST HOSP IP/OBS HIGH 75: CPT | Mod: ,,, | Performed by: FAMILY MEDICINE

## 2022-07-26 PROCEDURE — 63600175 PHARM REV CODE 636 W HCPCS: Performed by: INTERNAL MEDICINE

## 2022-07-26 PROCEDURE — 63600175 PHARM REV CODE 636 W HCPCS: Performed by: NURSE PRACTITIONER

## 2022-07-26 PROCEDURE — 83605 ASSAY OF LACTIC ACID: CPT | Performed by: NURSE PRACTITIONER

## 2022-07-26 PROCEDURE — 11000001 HC ACUTE MED/SURG PRIVATE ROOM

## 2022-07-26 PROCEDURE — 36415 COLL VENOUS BLD VENIPUNCTURE: CPT | Performed by: INTERNAL MEDICINE

## 2022-07-26 PROCEDURE — 25000003 PHARM REV CODE 250: Performed by: INTERNAL MEDICINE

## 2022-07-26 PROCEDURE — 27100171 HC OXYGEN HIGH FLOW UP TO 24 HOURS

## 2022-07-26 PROCEDURE — 80053 COMPREHEN METABOLIC PANEL: CPT | Performed by: NURSE PRACTITIONER

## 2022-07-26 PROCEDURE — 85025 COMPLETE CBC W/AUTO DIFF WBC: CPT | Performed by: NURSE PRACTITIONER

## 2022-07-26 PROCEDURE — 80048 BASIC METABOLIC PNL TOTAL CA: CPT | Mod: XB | Performed by: INTERNAL MEDICINE

## 2022-07-26 RX ORDER — PANTOPRAZOLE SODIUM 40 MG/10ML
40 INJECTION, POWDER, LYOPHILIZED, FOR SOLUTION INTRAVENOUS DAILY
Status: DISCONTINUED | OUTPATIENT
Start: 2022-07-26 | End: 2022-07-29 | Stop reason: HOSPADM

## 2022-07-26 RX ORDER — DEXTROSE MONOHYDRATE AND SODIUM CHLORIDE 5; .45 G/100ML; G/100ML
INJECTION, SOLUTION INTRAVENOUS CONTINUOUS
Status: DISCONTINUED | OUTPATIENT
Start: 2022-07-26 | End: 2022-07-27

## 2022-07-26 RX ADMIN — CEFEPIME 500 MG: 1 INJECTION, POWDER, FOR SOLUTION INTRAMUSCULAR; INTRAVENOUS at 10:07

## 2022-07-26 RX ADMIN — DEXTROSE AND SODIUM CHLORIDE: 5; .45 INJECTION, SOLUTION INTRAVENOUS at 12:07

## 2022-07-26 RX ADMIN — PANTOPRAZOLE SODIUM 40 MG: 40 INJECTION, POWDER, FOR SOLUTION INTRAVENOUS at 12:07

## 2022-07-26 NOTE — ASSESSMENT & PLAN NOTE
Patient with acute kidney injury likely due to IVVD/dehydration MICHAEL is currently worsening. Labs reviewed- Renal function/electrolytes with CrCl cannot be calculated (Unknown ideal weight.). according to latest data. Monitor urine output and serial BMP and adjust therapy as needed. Avoid nephrotoxins and renally dose meds for GFR listed above.

## 2022-07-26 NOTE — ED NOTES
Spoke with pt's nephew on the phone. Nephew reports pt is usually responsive and very conversational. Update given.

## 2022-07-26 NOTE — ASSESSMENT & PLAN NOTE
Patient with known CAD s/p stent placement, which is controlled Will continue ASA, Plavix and Statin and monitor for S/Sx of angina/ACS. Continue to monitor on telemetry.   Hold aspirin and Plavix and all oral medications at this time due to current mental status and blood noted in stool.

## 2022-07-26 NOTE — HPI
Abbey Ratliff is a 89 y.o. female with PMHx of carotid artery occlusion, aortic insufficiency, CKD, and prior MI who presents from her nursing home via EMS with  shortness of breath and altered mental status.  Upon arrival per EMS the patient's oxygen saturation was 60% and blood glucose 199. Patient was noncompliant with EMS and received 50 mg of ketamine IV.  Patient was diagnosed with COVID July 5th and tested negative for COVID a yesterday.  Patient presently is noted to have hypothermia with a rectal temp of 96° and persistent hypoxemia.  She is pulling off non-rebreather.  Staff reports patient had episode of diarrhea with bright red stool noted.   Laboratory data in the ED reveals significant evidence of dehydration with a sodium of 156 chloride 125 and CO2 11.  BUN creatinine 109 and 2.7 respectively.  Lactic acid 5.2.  WBC 21.74 Chest x-ray personally reviewed suspect early infiltrate.  Patient is noted to have encephalopathy and is only able to state name.  Information obtained from electronic health record and nursing home documents.

## 2022-07-26 NOTE — ED NOTES
Pt was able to tell me her name. Eyes remain open and moving extremities. Liquid brown diarrhea noted in brief. Pt cleaned and changed into a new brief. ED MD.

## 2022-07-26 NOTE — ASSESSMENT & PLAN NOTE
- Noted; patient currently on HFNC, per the nursing home that patient was apparently on oxygen for the last few days.

## 2022-07-26 NOTE — HPI
"Per H&P: "HPI: Abbey Ratliff is a 89 y.o. female with PMHx of carotid artery occlusion, aortic insufficiency, CKD, and prior MI who presents from her nursing home via EMS with  shortness of breath and altered mental status.  Upon arrival per EMS the patient's oxygen saturation was 60% and blood glucose 199. Patient was noncompliant with EMS and received 50 mg of ketamine IV.  Patient was diagnosed with COVID July 5th and tested negative for COVID a yesterday.  Patient presently is noted to have hypothermia with a rectal temp of 96° and persistent hypoxemia.  She is pulling off non-rebreather.  Staff reports patient had episode of diarrhea with bright red stool noted.   Laboratory data in the ED reveals significant evidence of dehydration with a sodium of 156 chloride 125 and CO2 11.  BUN creatinine 109 and 2.7 respectively.  Lactic acid 5.2.  WBC 21.74 Chest x-ray personally reviewed suspect early infiltrate.  Patient is noted to have encephalopathy and is only able to state name.  Information obtained from electronic health record and nursing home documents."    At time of initial consult, patient lying in bed, she is confused. Palliative medicine consulted for goals of care/ advance care planning.   "

## 2022-07-26 NOTE — ASSESSMENT & PLAN NOTE
"This patient does have evidence of infective focus  My overall impression is sepsis. Vital signs were reviewed and noted in progress note.  Antibiotics given-   Antibiotics (From admission, onward)            Start     Stop Route Frequency Ordered    07/25/22 2215  cefepime in dextrose 5 % IVPB 2 g         -- IV Every 12 hours (non-standard times) 07/25/22 2107 07/25/22 2208  vancomycin - pharmacy to dose  (vancomycin IVPB)        "And" Linked Group Details    -- IV pharmacy to manage frequency 07/25/22 2108 07/25/22 1900  vancomycin in dextrose 5 % 1 gram/250 mL IVPB 1,000 mg         07/26 0659 IV ED 1 Time 07/25/22 1848        Cultures were taken-   Microbiology Results (last 7 days)     Procedure Component Value Units Date/Time    Urine culture [[id number]] Collected: 07/25/22 2041    Order Status: Sent Specimen: Urine, Catheterized Updated: 07/25/22 2042    Blood Culture #1 **CANNOT BE ORDERED STAT** [[id number]] Collected: 07/25/22 1658    Order Status: Sent Specimen: Blood Updated: 07/25/22 1846    Blood Culture #2 **CANNOT BE ORDERED STAT** [[id number]]     Order Status: No result Specimen: Blood         Latest lactate reviewed, they are-  Recent Labs   Lab 07/25/22 2001   LACTATE 5.2*       Organ dysfunction indicated by Acute kidney injury, Encephalopathy  and Acute respiratory failure  Source- suspect early pneumonia.  Patient with severe dehydration encephalopathy with metabolic acidosis and elevated lactate.    Source control Achieved by- initiated on IV hydration, bicarb drip x1, IV antibiotics including cefepime and vancomycin. Trend lactic acid      "

## 2022-07-26 NOTE — ASSESSMENT & PLAN NOTE
Will consult with GI. Suspect with frailty, baseline mental status and wishes of family that likely no procedure will be completed.  Place on PPI IV, stop antiplatelets/anticoagulants  Monitor for further transfusion needs

## 2022-07-26 NOTE — ASSESSMENT & PLAN NOTE
"- Consult placed for advance care planning/ goals of care in elderly, frail patient admitted with respiratory failure. She was recently diagnosed with COVID. When I met the patient, she was lying in bed, appeared SOB, was taking oxygen off. She was able to state her name, however kept saying "help me, help me".  - I contacted [facility], she has been a resident there for a long time. She does not have a HCPOA on file, however the patients nephew, Donis, has stated he is HCPOA but has not provided documents yet.   - I contacted Donis, he is the patients nephew. The patient has been a resident at [facility] for several years. She has no children. She has several nieces and nephews. Donis does not live in [place]. We discussed the patients current condition and that her prognosis is guarded, discussed she is having rectal bleeding and is on HFNC. He is planning to come in to town on Thursday or Friday. His care preferences for Ms. Ratliff are to focus on acute/ reversible conditions. I did discuss with him that the patient is very frail and elderly and she may decline pretty quickly, he is aware of this. He reiterated the patients wishes for DNR/ DNI. Donis Hopkins ([phone]) reports he is HCPOA, the paperwork is at his office and he will fax it to me when it is available. Of note, Donis reports Chico Paul is the patients ex nephew, he was  to the patients niece. However, Chico lives locally and has been very active in helping to care for Ms. Ratliff.   - I attempted to contact patients hui, Abbey, unable to reach her.  - I contacted Chico Paul. We discussed patients current plan of care and that prognosis is guarded. He asked if he should have family visit, I portrayed my transparent concern that Ms. Ratliff status is very guarded and she may decline quickly and if visiting is important to family then I would advise this. Chico understands that the patient is very frail and elderly and very " acutely ill. [place] reiterated that Donis if HCPOA  - For now, continue current level of care, patient DNR/ DNI. Family is aware of patients guarded prognosis and if patients status acutely changes, please reach out to family immediately.

## 2022-07-26 NOTE — ED TRIAGE NOTES
Pt has eyes open. No verbal response. Muscles tense. 97% on NRB. Pt altered with multiple episodes of diarrhea today. Pt hypoxic with EMS. Ventilations with BVM given and tolerated. Pt then placed on 15 L NRB with O2 maintaining >93%. Pt hypotensive with systolic in the 70s. 1 L of LR initiated. Abd appears distended. Brown secretions from mouth noted.

## 2022-07-26 NOTE — PROGRESS NOTES
[facility] Medicine  Progress Note    Patient Name: Abbey Ratliff  MRN: [id number]  Patient Class: IP- Inpatient   Admission Date: 7/25/2022  Length of Stay: 0 days  Attending Physician: Kaushal Bonilla MD  Primary Care Provider: [facility]        Subjective:     Principal Problem:Acute hypoxemic respiratory failure        HPI:  Abbey Ratliff is a 89 y.o. female with PMHx of carotid artery occlusion, aortic insufficiency, CKD, and prior MI who presents from her nursing home via EMS with  shortness of breath and altered mental status.  Upon arrival per EMS the patient's oxygen saturation was 60% and blood glucose 199. Patient was noncompliant with EMS and received 50 mg of ketamine IV.  Patient was diagnosed with COVID July 5th and tested negative for COVID a yesterday.  Patient presently is noted to have hypothermia with a rectal temp of 96° and persistent hypoxemia.  She is pulling off non-rebreather.  Staff reports patient had episode of diarrhea with bright red stool noted.   Laboratory data in the ED reveals significant evidence of dehydration with a sodium of 156 chloride 125 and CO2 11.  BUN creatinine 109 and 2.7 respectively.  Lactic acid 5.2.  WBC 21.74 Chest x-ray personally reviewed suspect early infiltrate.  Patient is noted to have encephalopathy and is only able to state name.  Information obtained from electronic health record and nursing home documents.        Overview/Hospital Course:  No notes on file    Interval History: Was able to relate her name to me but otherwise not oriented to answer questions. Per treatment team she is oriented to self at NH. Noted significant drop in Hb, still remains significantly hypernatremic, renal failure, lactic acidosis    Review of Systems   Unable to perform ROS: Dementia   Objective:     Vital Signs (Most Recent):  Temp: 97.6 °F (36.4 °C) (07/26/22 1145)  Pulse: 88 (07/26/22 1145)  Resp: 18 (07/26/22 1145)  BP: (!) 114/53 (07/26/22  1145)  SpO2: (!) 94 % (07/26/22 1145)   Vital Signs (24h Range):  Temp:  [96 °F (35.6 °C)-98.3 °F (36.8 °C)] 97.6 °F (36.4 °C)  Pulse:  [69-92] 88  Resp:  [18-47] 18  SpO2:  [94 %-100 %] 94 %  BP: ()/() 114/53     Weight: 44.5 kg (98 lb 1.7 oz)  Body mass index is 18.54 kg/m².    Intake/Output Summary (Last 24 hours) at 7/26/2022 1411  Last data filed at 7/25/2022 2019  Gross per 24 hour   Intake 50 ml   Output --   Net 50 ml      Physical Exam  Vitals and nursing note reviewed.   Constitutional:       General: She is in acute distress.      Appearance: She is well-developed. She is ill-appearing.      Comments: cachexia   HENT:      Head: Normocephalic.      Right Ear: External ear normal.      Left Ear: External ear normal.   Eyes:      Conjunctiva/sclera: Conjunctivae normal.      Pupils: Pupils are equal, round, and reactive to light.   Cardiovascular:      Rate and Rhythm: Normal rate and regular rhythm.      Heart sounds: Murmur heard.   Pulmonary:      Effort: Respiratory distress present.      Breath sounds: Normal breath sounds.   Abdominal:      General: Bowel sounds are normal.      Palpations: Abdomen is soft.      Tenderness: There is no abdominal tenderness.   Musculoskeletal:         General: Swelling and tenderness present. Normal range of motion.      Cervical back: Normal range of motion and neck supple.   Skin:     General: Skin is warm and dry.      Findings: Bruising present.   Neurological:      Mental Status: She is disoriented.      Motor: Weakness present.       Significant Labs: All pertinent labs within the past 24 hours have been reviewed.    Significant Imaging: I have reviewed all pertinent imaging results/findings within the past 24 hours.      Assessment/Plan:      * Acute hypoxemic respiratory failure  Patient with Hypoxic respiratory failure which is Acute which is not related to a recent procedure.  she is not on home oxygen. Supplemental oxygen was provided and noted-  Non-rebreather  % FiO2  Signs/symptoms of respiratory failure include- tachypnea, increased work of breathing, respiratory distress and wheezing. Contributing diagnoses includes - Aspiration and Pneumonia Labs and images were reviewed. Patient Has not has a recent ABG. Will treat underlying causes and adjust management of respiratory failure as follows- check abgs.  Patient on 100% non-rebreather presently.  Initiated on IV vancomycin and cefepime.     Patient does not wish intubation.  Wishes for BiPAP or CPAP if required.          Hypernatremia  Will change fluids to D51/2NS and monitor with serial BMPs, monitor UOP.      Acute GI bleeding  Will consult with GI. Suspect with frailty, baseline mental status and wishes of family that likely no procedure will be completed.  Place on PPI IV, stop antiplatelets/anticoagulants  Monitor for further transfusion needs      ACP (advance care planning)  Advance Care Planning     Date: 07/25/2022    [facility]  I engaged the family and healthcare power of   in a conversation about advance care planning and we specifically addressed what the goals of care would be moving forward, in light of the patient's change in clinical status, specifically patient with overall very poor prognosis with high mortality rate in the setting of severe metabolic acidosis, acute respiratory failure encephalopathy and acute kidney injury.  We did specifically address the patient's likely prognosis, which is poor.  We explored the patient's values and preferences for future care.  The family and healthcare power of   endorses that what is most important right now is to focus on spending time at home, avoiding the hospital and symptom/pain control    Accordingly, we have decided that the best plan to meet the patient's goals includes continuing with treatment    I did not explain the role for hospice care at this stage of the patient's illness, including its ability to help the patient live  "with the best quality of life possible.  We will not be making a hospice referral.    I spent a total of 30 minutes engaging the patient in this advance care planning discussion.    Discussed with nephew, Chico Murrell patient's prognosis is poor with high mortality rate.  Patient is with imminent death.  Discussed with nephew will initiate IV fluids, IV antibiotics, and BiPAP CPAP per patient's [place] post document.                Sepsis  This patient does have evidence of infective focus  My overall impression is sepsis. Vital signs were reviewed and noted in progress note.  Antibiotics given-   Antibiotics (From admission, onward)            Start     Stop Route Frequency Ordered    07/25/22 2215  cefepime in dextrose 5 % IVPB 2 g         -- IV Every 12 hours (non-standard times) 07/25/22 2107 07/25/22 2208  vancomycin - pharmacy to dose  (vancomycin IVPB)        "And" Linked Group Details    -- IV pharmacy to manage frequency 07/25/22 2108 07/25/22 1900  vancomycin in dextrose 5 % 1 gram/250 mL IVPB 1,000 mg         07/26 0659 IV ED 1 Time 07/25/22 1848        Cultures were taken-   Microbiology Results (last 7 days)     Procedure Component Value Units Date/Time    Urine culture [[id number]] Collected: 07/25/22 2041    Order Status: Sent Specimen: Urine, Catheterized Updated: 07/25/22 2042    Blood Culture #1 **CANNOT BE ORDERED STAT** [[id number]] Collected: 07/25/22 1658    Order Status: Sent Specimen: Blood Updated: 07/25/22 1846    Blood Culture #2 **CANNOT BE ORDERED STAT** [[id number]]     Order Status: No result Specimen: Blood         Latest lactate reviewed, they are-  Recent Labs   Lab 07/25/22 2001   LACTATE 5.2*       Organ dysfunction indicated by Acute kidney injury, Encephalopathy  and Acute respiratory failure  Source- suspect early pneumonia.  Patient with severe dehydration encephalopathy with metabolic acidosis and elevated lactate.    Source control Achieved by- initiated on IV hydration, " bicarb drip x1, IV antibiotics including cefepime and vancomycin. Trend lactic acid        Aspiration pneumonia  Suspected early aspiration pneumonia.  Initiated on cefepime and vancomycin.      MICHAEL (acute kidney injury)  Patient with acute kidney injury likely due to IVVD/dehydration MICHAEL is currently worsening. Labs reviewed- Renal function/electrolytes with CrCl cannot be calculated (Unknown ideal weight.). according to latest data. Monitor urine output and serial BMP and adjust therapy as needed. Avoid nephrotoxins and renally dose meds for GFR listed above.       Encephalopathy, metabolic  See sepsis       Coronary artery disease  Patient with known CAD s/p stent placement, which is controlled Will continue ASA, Plavix and Statin and monitor for S/Sx of angina/ACS. Continue to monitor on telemetry.   Hold aspirin and Plavix and all oral medications at this time due to current mental status and blood noted in stool.        VTE Risk Mitigation (From admission, onward)         Ordered     IP VTE HIGH RISK PATIENT  Once         07/25/22 2017     Place sequential compression device  Until discontinued         07/25/22 2017                Discharge Planning   OLIVIA:      Code Status: DNR   Is the patient medically ready for discharge?:     Reason for patient still in hospital (select all that apply): Patient unstable                     Kaushal Bonilla MD  Department of Hospital Medicine   [facility] ([place])

## 2022-07-26 NOTE — CONSULTS
[facility] ([place])  Gastroenterology  Consult Note    Patient Name: Abbey Ratliff  MRN: [id number]  Admission Date: 7/25/2022  Hospital Length of Stay: 0 days  Code Status: DNR   Attending Provider:   Consulting Provider: Max Davila MD  Primary Care Physician: [facility]  Principal Problem:Acute hypoxemic respiratory failure    Inpatient consult to Gastroenterology  Consult performed by: Max Davila MD  Consult ordered by: Kaushal Bonilla MD  Reason for consult: Gi blood loss        Subjective:     HPI: This lady presented to the ED via EMS transported from a nursing home with hypoxic respiratory failure and severe dehydration. She achieved stability with NRB oxygen and fluids and she started to have diarrhea associated with fresh blood devoid of clots. Her vital signs improved and she became more communicative though she has dementia and limited cognition precluding getting a history from her. As such much information was obtained by chart review.. No family history known for ulcers or colon cancer and she does not use alcohol or tobacco. She is currently hemodynamically stable and her stool was collected for clostridium difficile. She has a food tray near her and will need help in that regard.     Past Medical History:   Diagnosis Date    Aortic insufficiency     Arthritis     Carotid artery occlusion     CKD (chronic kidney disease)     MI (myocardial infarction)        No past surgical history on file.    Review of patient's allergies indicates:  No Known Allergies  Family History    None       Tobacco Use    Smoking status: Never Smoker    Smokeless tobacco: Not on file   Substance and Sexual Activity    Alcohol use: No    Drug use: No    Sexual activity: Not Currently     Review of Systems   Unable to perform ROS: Dementia     Objective:     Vital Signs (Most Recent):  Temp: 97.6 °F (36.4 °C) (07/26/22 1145)  Pulse: 88 (07/26/22 1145)  Resp: 18 (07/26/22 1145)  BP: (!) 114/53  (07/26/22 1145)  SpO2: (!) 94 % (07/26/22 1145) Vital Signs (24h Range):  Temp:  [96 °F (35.6 °C)-98.3 °F (36.8 °C)] 97.6 °F (36.4 °C)  Pulse:  [69-92] 88  Resp:  [18-47] 18  SpO2:  [94 %-100 %] 94 %  BP: ()/() 114/53     Weight: 44.5 kg (98 lb 1.7 oz) (07/25/22 2206)  Body mass index is 18.54 kg/m².      Intake/Output Summary (Last 24 hours) at 7/26/2022 1506  Last data filed at 7/25/2022 2019  Gross per 24 hour   Intake 50 ml   Output --   Net 50 ml       Lines/Drains/Airways     Peripheral Intravenous Line  Duration                Peripheral IV - Single Lumen 07/25/22 1830 20 G Right Wrist <1 day                Physical Exam  Vitals and nursing note reviewed.   Constitutional:       General: She is not in acute distress.     Appearance: She is ill-appearing. She is not toxic-appearing or diaphoretic.      Comments: elderly frail mildly contracted   HENT:      Head: Normocephalic and atraumatic.      Nose: Nose normal. No congestion.      Mouth/Throat:      Mouth: Mucous membranes are dry.      Pharynx: No oropharyngeal exudate or posterior oropharyngeal erythema.   Eyes:      General: No scleral icterus.     Pupils: Pupils are equal, round, and reactive to light.   Cardiovascular:      Rate and Rhythm: Normal rate and regular rhythm.      Heart sounds:     No gallop.   Pulmonary:      Breath sounds: Wheezing and rhonchi present.   Abdominal:      General: Bowel sounds are normal. There is distension.      Palpations: Abdomen is soft. There is no mass.      Tenderness: There is no abdominal tenderness. There is no guarding or rebound.      Hernia: No hernia is present.   Musculoskeletal:         General: Swelling and deformity present. No tenderness.      Cervical back: Rigidity present. No tenderness.   Lymphadenopathy:      Cervical: No cervical adenopathy.   Skin:     General: Skin is warm and dry.      Coloration: Skin is not jaundiced.   Neurological:      Mental Status: She is alert. Mental  status is at baseline. She is disoriented.      Comments: bedridden   Psychiatric:      Comments: Confused, disoriented         Significant Labs:  Hypernatremia, and elevated BUN, creatinine, BNP, lactate, et al.    Significant Imaging:  Chest film reviewed    Assessment/Plan: This lad is experiencing a bloody diarrhea and she has had a decline in her H/H. This can be explained in part by rehydration as she was severely dehydrated on admission. Her renal parameters are better and her mental status is better than what was described although she has serious issues with dementia. She has no acute visceral signs or that of ischemia. She may have an infection based issue and concur with stool study. She may have diverticulosis related blood loss although no clots were noted. Will advise close observation and support with blood products as needed to keep Hb>8.0 Because of her frail condition and hemodynamic stability at this time, no plans for endoscopic assessments. Discussed with nursing staff.     Active Diagnoses:    Diagnosis Date Noted POA    PRINCIPAL PROBLEM:  Acute hypoxemic respiratory failure [J96.01] 07/25/2022 Yes    Acute GI bleeding [K92.2] 07/26/2022 Clinically Undetermined    Hypernatremia [E87.0] 07/26/2022 Yes    Encephalopathy, metabolic [G93.41] 07/25/2022 Yes    MICHAEL (acute kidney injury) [N17.9] 07/25/2022 Yes    Aspiration pneumonia [J69.0] 07/25/2022 Yes    Sepsis [A41.9] 07/25/2022 Yes    ACP (advance care planning) [Z71.89] 07/25/2022 Not Applicable    Coronary artery disease [I25.10] 12/04/2015 Yes      Problems Resolved During this Admission:           Thank you for your consult.     Max Davila MD  Gastroenterology  [facility] ([place])

## 2022-07-26 NOTE — SUBJECTIVE & OBJECTIVE
Past Medical History:   Diagnosis Date    Aortic insufficiency     Arthritis     Carotid artery occlusion     CKD (chronic kidney disease)     MI (myocardial infarction)        No past surgical history on file.    Review of patient's allergies indicates:  No Known Allergies    No current facility-administered medications on file prior to encounter.     Current Outpatient Medications on File Prior to Encounter   Medication Sig    aspirin 81 MG Chew Take 81 mg by mouth once daily.    clopidogrel (PLAVIX) 75 mg tablet Take 75 mg by mouth once daily.    docusate sodium (COLACE) 100 MG capsule Take 100 mg by mouth once daily.    hydrocodone-acetaminophen 5-325mg (NORCO) 5-325 mg per tablet Take 1 tablet by mouth 2 (two) times daily as needed for Pain.    isosorbide mononitrate (IMDUR) 30 MG 24 hr tablet Take 30 mg by mouth once daily.    metoprolol succinate (TOPROL-XL) 25 MG 24 hr tablet Take 25 mg by mouth once daily.     Family History    None       Tobacco Use    Smoking status: Never Smoker    Smokeless tobacco: Not on file   Substance and Sexual Activity    Alcohol use: No    Drug use: No    Sexual activity: Not Currently     Review of Systems   Unable to perform ROS: Acuity of condition   Objective:     Vital Signs (Most Recent):  Temp: 96 °F (35.6 °C) (07/25/22 2043)  Pulse: 75 (07/25/22 2058)  Resp: 20 (07/25/22 1913)  BP: (!) 133/58 (07/25/22 2058)  SpO2: 100 % (07/25/22 2048)   Vital Signs (24h Range):  Temp:  [96 °F (35.6 °C)-96.4 °F (35.8 °C)] 96 °F (35.6 °C)  Pulse:  [69-85] 75  Resp:  [20-47] 20  SpO2:  [95 %-100 %] 100 %  BP: ()/(32-75) 133/58        There is no height or weight on file to calculate BMI.    Physical Exam  Vitals and nursing note reviewed.   Constitutional:       General: She is in acute distress.      Appearance: She is well-developed. She is ill-appearing.      Comments: cachexia   HENT:      Head: Normocephalic.      Right Ear: External ear normal.      Left Ear: External ear  normal.   Eyes:      Conjunctiva/sclera: Conjunctivae normal.      Pupils: Pupils are equal, round, and reactive to light.   Cardiovascular:      Rate and Rhythm: Normal rate and regular rhythm.      Heart sounds: Murmur heard.   Pulmonary:      Effort: Respiratory distress present.      Breath sounds: Normal breath sounds.   Abdominal:      General: Bowel sounds are normal.      Palpations: Abdomen is soft.      Tenderness: There is no abdominal tenderness.   Musculoskeletal:         General: Swelling and tenderness present. Normal range of motion.      Cervical back: Normal range of motion and neck supple.   Skin:     General: Skin is warm and dry.      Findings: Bruising present.   Neurological:      Mental Status: She is disoriented.      Motor: Weakness present.         CRANIAL NERVES     CN III, IV, VI   Pupils are equal, round, and reactive to light.     Significant Labs: All pertinent labs within the past 24 hours have been reviewed.  CBC:   Recent Labs   Lab 07/25/22  1832   WBC 21.74*   HGB 12.6   HCT 40.8        CMP:   Recent Labs   Lab 07/25/22 2001   *   K 4.9   *   CO2 11*   *   *   CREATININE 2.7*   CALCIUM 9.3   PROT 7.5   ALBUMIN 3.6   BILITOT 0.5   ALKPHOS 86   AST 37   ALT 26   ANIONGAP 20*   EGFRNONAA 14*     Lactic Acid:   Recent Labs   Lab 07/25/22 2001   LACTATE 5.2*     Troponin:   Recent Labs   Lab 07/25/22 2001   TROPONINI 0.112*       Significant Imaging: I have reviewed all pertinent imaging results/findings within the past 24 hours.

## 2022-07-26 NOTE — ASSESSMENT & PLAN NOTE
Advance Care Planning     Date: 07/25/2022    [facility]  I engaged the family and healthcare power of   in a conversation about advance care planning and we specifically addressed what the goals of care would be moving forward, in light of the patient's change in clinical status, specifically patient with overall very poor prognosis with high mortality rate in the setting of severe metabolic acidosis, acute respiratory failure encephalopathy and acute kidney injury.  We did specifically address the patient's likely prognosis, which is poor.  We explored the patient's values and preferences for future care.  The family and healthcare power of   endorses that what is most important right now is to focus on spending time at home, avoiding the hospital and symptom/pain control    Accordingly, we have decided that the best plan to meet the patient's goals includes continuing with treatment    I did not explain the role for hospice care at this stage of the patient's illness, including its ability to help the patient live with the best quality of life possible.  We will not be making a hospice referral.    I spent a total of 30 minutes engaging the patient in this advance care planning discussion.    Discussed with Chico hewitt patient's prognosis is poor with high mortality rate.  Patient is with imminent death.  Discussed with nephew will initiate IV fluids, IV antibiotics, and BiPAP CPAP per patient's [place] post document.

## 2022-07-26 NOTE — CONSULTS
"[facility] ([place])  Palliative Medicine  Consult Note    Patient Name: Abbey Ratliff  MRN: [id number]  Admission Date: 7/25/2022  Hospital Length of Stay: 0 days  Code Status: DNR   Attending Provider: Kaushal Bonilla MD  Consulting Provider: Gina Alexandra DNP  Primary Care Physician: [facility]  Principal Problem:Acute hypoxemic respiratory failure    Patient information was obtained from patient, relative(s) and primary team.      Inpatient consult to Palliative Care  Consult performed by: Gina Alexandra DNP  Consult ordered by: Kaushal Bonilla MD        Assessment/Plan:     * Acute hypoxemic respiratory failure  - Noted; patient currently on HFNC, per the nursing home that patient was apparently on oxygen for the last few days.     ACP (advance care planning)  - Consult placed for advance care planning/ goals of care in elderly, frail patient admitted with respiratory failure. She was recently diagnosed with COVID. When I met the patient, she was lying in bed, appeared SOB, was taking oxygen off. She was able to state her name, however kept saying "help me, help me".  - I contacted [facility], she has been a resident there for a long time. She does not have a HCPOA on file, however the patients nephew, Donis, has stated he is HCPOA but has not provided documents yet.   - I contacted Donis, he is the patients nephew. The patient has been a resident at [facility] for several years. She has no children. She has several nieces and nephews. Donis does not live in [place]. We discussed the patients current condition and that her prognosis is guarded, discussed she is having rectal bleeding and is on HFNC. He is planning to come in to town on Thursday or Friday. His care preferences for Ms. Ratliff are to focus on acute/ reversible conditions. I did discuss with him that the patient is very frail and elderly and she may decline pretty quickly, he is aware of this. He reiterated the " "patients wishes for DNR/ DNI. Donis Hopkins ([phone]) reports he is HCPOA, the paperwork is at his office and he will fax it to me when it is available. Of note, Donis reports Chico Paul is the patients ex nephew, he was  to the patients niece. However, Chico lives locally and has been very active in helping to care for Ms. Ratliff.   - I attempted to contact patients niece, Abbey, unable to reach her.  - I contacted Chico Paul. We discussed patients current plan of care and that prognosis is guarded. He asked if he should have family visit, I portrayed my transparent concern that Ms. Ratliff status is very guarded and she may decline quickly and if visiting is important to family then I would advise this. Chico understands that the patient is very frail and elderly and very acutely ill. Chico reiterated that Donis if HCPOA  - For now, continue current level of care, patient DNR/ DNI. Family is aware of patients guarded prognosis and if patients status acutely changes, please reach out to family immediately.     Aspiration pneumonia  - Noted; management per primary team     Encephalopathy, metabolic  - Noted; patient with AMS on arrival  - Per nursing home, at baseline patient is alert and able to state her name but largely confused         Thank you for your consult. I will follow-up with patient. Please contact us if you have any additional questions.    Subjective:     HPI:   Per H&P: "HPI: Abbey Ratliff is a 89 y.o. female with PMHx of carotid artery occlusion, aortic insufficiency, CKD, and prior MI who presents from her nursing home via EMS with  shortness of breath and altered mental status.  Upon arrival per EMS the patient's oxygen saturation was 60% and blood glucose 199. Patient was noncompliant with EMS and received 50 mg of ketamine IV.  Patient was diagnosed with COVID July 5th and tested negative for COVID a yesterday.  Patient presently is noted to have hypothermia with a rectal temp of " "96° and persistent hypoxemia.  She is pulling off non-rebreather.  Staff reports patient had episode of diarrhea with bright red stool noted.   Laboratory data in the ED reveals significant evidence of dehydration with a sodium of 156 chloride 125 and CO2 11.  BUN creatinine 109 and 2.7 respectively.  Lactic acid 5.2.  WBC 21.74 Chest x-ray personally reviewed suspect early infiltrate.  Patient is noted to have encephalopathy and is only able to state name.  Information obtained from electronic health record and nursing home documents."    At time of initial consult, patient lying in bed, she is confused. Palliative medicine consulted for goals of care/ advance care planning.       Hospital Course:  No notes on file    Interval History: Patient lying in bed, confused, appears uncomfortable.     Past Medical History:   Diagnosis Date    Aortic insufficiency     Arthritis     Carotid artery occlusion     CKD (chronic kidney disease)     MI (myocardial infarction)        No past surgical history on file.    Review of patient's allergies indicates:  No Known Allergies    Medications:  Continuous Infusions:   dextrose 5 % and 0.45 % NaCl 75 mL/hr at 07/26/22 1225     Scheduled Meds:   ceFEPime (MAXIPIME) IVPB  500 mg Intravenous Q24H    pantoprazole  40 mg Intravenous Daily     PRN Meds:acetaminophen, albuterol-ipratropium, aluminum-magnesium hydroxide-simethicone, glucagon (human recombinant), glucose, glucose, HYDROcodone-acetaminophen, magnesium oxide, magnesium oxide, melatonin, naloxone, ondansetron, prochlorperazine, senna-docusate 8.6-50 mg, simethicone, sodium chloride 0.9%, Pharmacy to dose Vancomycin consult **AND** vancomycin - pharmacy to dose    Family History    None       Tobacco Use    Smoking status: Never Smoker    Smokeless tobacco: Not on file   Substance and Sexual Activity    Alcohol use: No    Drug use: No    Sexual activity: Not Currently       Review of Systems   Unable to perform " ROS: Mental status change   Objective:     Vital Signs (Most Recent):  Temp: 97.6 °F (36.4 °C) (07/26/22 1145)  Pulse: 88 (07/26/22 1145)  Resp: 18 (07/26/22 1145)  BP: (!) 114/53 (07/26/22 1145)  SpO2: 97 % (07/26/22 0803)   Vital Signs (24h Range):  Temp:  [96 °F (35.6 °C)-98.3 °F (36.8 °C)] 97.6 °F (36.4 °C)  Pulse:  [69-92] 88  Resp:  [18-47] 18  SpO2:  [95 %-100 %] 97 %  BP: ()/() 114/53     Weight: 44.5 kg (98 lb 1.7 oz)  Body mass index is 18.54 kg/m².    Physical Exam  Constitutional:       General: She is in acute distress.      Appearance: She is ill-appearing (Chronically and acutely ill appearing).   Cardiovascular:      Rate and Rhythm: Normal rate.   Pulmonary:      Effort: Respiratory distress present.   Neurological:      Mental Status: She is alert.      Comments: Able to state name        Review of Symptoms      Symptom Assessment (ESAS 0-10 Scale)  Unable to complete assessment due to Mental status change       Pain Assessment in Advanced Demential Scale (PAINAD)   Breathing - Independent of vocalization:  1  Negative vocalization:  1  Facial expression:  1  Body language:  1  Consolability:  0  Total:  4    Performance Status:  40    Living Arrangements:  Lives in nursing home      Advance Care Planning   Advance Directives:   Do Not Resuscitate Status: Yes      Decision Making:  Family answered questions     All pertinent labs within the past 24 hours have been reviewed.  Significant Labs:   CBC:   Recent Labs   Lab 07/26/22  0404   WBC 15.76*   HGB 9.3*   HCT 30.0*   *        BMP:  Recent Labs   Lab 07/26/22  0404   *   *   K 4.1   *   CO2 16*   BUN 93*   CREATININE 2.2*   CALCIUM 7.6*   MG 2.0     LFT:  Lab Results   Component Value Date    AST 26 07/26/2022    ALKPHOS 63 07/26/2022    BILITOT 0.4 07/26/2022     Albumin:   Albumin   Date Value Ref Range Status   07/26/2022 2.6 (L) 3.5 - 5.2 g/dL Final     Protein:   Total Protein   Date Value Ref  Range Status   07/26/2022 5.3 (L) 6.0 - 8.4 g/dL Final     Lactic acid:   Lab Results   Component Value Date    LACTATE 2.7 (H) 07/26/2022    LACTATE 2.8 (H) 07/25/2022       Significant Imaging: I have reviewed all pertinent imaging results/findings within the past 24 hours.      Total visit time: 86 minutes   > 50% of 70 min visit spent in chart review, face to face discussion of symptom assessment, coordination of care with other specialists, and d/c planning  16 minutes ACP time spent: goals of care, emotional support, formulating and communication prognosis and goals of care, exploring burden/ benefit of various approaches of treatment.       Gina Alexandra, AYAAN  Palliative Medicine  [facility]  Med [facility]urg ([place])

## 2022-07-26 NOTE — ASSESSMENT & PLAN NOTE
- Noted; patient with AMS on arrival  - Per nursing home, at baseline patient is alert and able to state her name but largely confused

## 2022-07-26 NOTE — PLAN OF CARE
07/26/22 1815   Discharge Assessment   Assessment Type Discharge Planning Assessment   Confirmed/corrected address, phone number and insurance Yes   Confirmed Demographics Correct on Facesheet   Source of Information health record   If unable to respond/provide information was family/caregiver contacted? Other (see comments)  (Information obtained from the patient recorded.)   Communicated OLIVIA with patient/caregiver Date not available/Unable to determine   Reason For Admission Acute Hypoxemic Respiratory Failure   Lives With facility resident   Facility Arrived From: [facility]   Do you expect to return to your current living situation? Yes   Prior to hospitilization cognitive status:   (Oriented to person)   Current cognitive status: Unable to Assess   Walking or Climbing Stairs Difficulty   (Bed Bound prior)   Dressing/Bathing Difficulty dressing difficulty, dependent   Equipment Currently Used at Home hospital bed   Readmission within 30 days? No   Patient currently being followed by outpatient case management? No   Do you take prescription medications? Yes   Do you have prescription coverage? Yes   Coverage Medicare A & B   Do you have any problems affording any of your prescribed medications? No   Is the patient taking medications as prescribed? yes   Who is going to help you get home at discharge? Transport/ambulance   How do you get to doctors appointments? agency   Are you on dialysis? No   Do you take coumadin? No   Discharge Plan A Return to nursing home   DME Needed Upon Discharge  none   Discharge Plan discussed with:   (Read patient notes did not reach MPOA.)   Discharge Barriers Identified None   Relationship/Environment   Name(s) of Who Lives With Patient Nursing Home Resident   [facility] - Med Surg ([place])  Initial Discharge Assessment       Primary Care Provider: [facility]    Admission Diagnosis: Dehydration [E86.0]  Hypernatremia [E87.0]  SOB (shortness of breath) [R06.02]  MICHAEL  (acute kidney injury) [N17.9]  Chest pain [R07.9]  Acute hypoxemic respiratory failure [J96.01]    Admission Date: 7/25/2022  Expected Discharge Date:     Discharge Barriers Identified: (P) None    Payor: MEDICARE / Plan: MEDICARE PART A & B / Product Type: Government /     Extended Emergency Contact Information  Primary Emergency Contact: Donis Hopkins  Mobile Phone: [phone]  Relation: Relative   needed? No  Secondary Emergency Contact: Chico Paul   [facility]  Home Phone: [phone]  Relation: Relative    Discharge Plan A: (P) Return to nursing home         [facility] - [place] - [facility] [place], [place] - [address]  [address]  [address]  [place] [address]  Phone: [phone] Fax: [phone]      Initial Assessment (most recent)       Adult Discharge Assessment - 07/26/22 1815          Discharge Assessment    Assessment Type Discharge Planning Assessment     Confirmed/corrected address, phone number and insurance Yes     Confirmed Demographics Correct on Facesheet     Source of Information health record     If unable to respond/provide information was family/caregiver contacted? Other (see comments)   Information obtained from the patient recorded.    Communicated OLIVIA with patient/caregiver Date not available/Unable to determine     Reason For Admission Acute Hypoxemic Respiratory Failure (P)      Lives With facility resident (P)      Facility Arrived From: [facility] (P)      Do you expect to return to your current living situation? Yes (P)      Prior to hospitilization cognitive status: -- (P)    Oriented to person    Current cognitive status: Unable to Assess (P)      Walking or Climbing Stairs Difficulty -- (P)    Bed Bound prior    Dressing/Bathing Difficulty dressing difficulty, dependent (P)      Equipment Currently Used at Home hospital bed (P)      Readmission within 30 days? No (P)      Patient currently being followed by outpatient case management? No (P)       Do you take prescription medications? Yes (P)      Do you have prescription coverage? Yes (P)      Coverage Medicare A & B (P)      Do you have any problems affording any of your prescribed medications? No (P)      Is the patient taking medications as prescribed? yes (P)      Who is going to help you get home at discharge? Transport/ambulance (P)      How do you get to doctors appointments? agency (P)      Are you on dialysis? No (P)      Do you take coumadin? No (P)      Discharge Plan A Return to nursing home (P)      DME Needed Upon Discharge  none (P)      Discharge Plan discussed with: -- (P)    Read patient notes did not reach MPOA.    Discharge Barriers Identified None (P)         Relationship/Environment    Name(s) of Who Lives With Patient Nursing Home Resident (P)

## 2022-07-26 NOTE — PLAN OF CARE
Transitioned patient from NRB to hiflo bubble cannula with good results in O2 saturation. Further weaning of flow should be possible.

## 2022-07-26 NOTE — H&P
[facility] Medicine  History & Physical    Patient Name: Abbey Ratliff  MRN: [id number]  Patient Class: OP- Observation  Admission Date: 7/25/2022  Attending Physician: Renetta Pagan NP  Primary Care Provider: [facility]         Patient information was obtained from EMS personnel, nursing home and ER records.     Subjective:     Principal Problem:Acute hypoxemic respiratory failure    Chief Complaint:   Chief Complaint   Patient presents with    Shortness of Breath     Sent from nursing home for SOB, back pain and agitation. Diagnosed with COVID 1 month ago. Given 50 mg ketamine IV per EMS pta. dui    Altered Mental Status     Pt started to have diarrhea and became altered. today Per EMS pt was hypoxic in the 60s and was given assisted ventilation with the BVM. Pulse detected. Pt was not responsive to stimulation. Pt improved O2 >93% with 15L NRB. Blood glucose normal with EMS. DNR.     Diarrhea        HPI: Abbey Ratliff is a 89 y.o. female with PMHx of carotid artery occlusion, aortic insufficiency, CKD, and prior MI who presents from her nursing home via EMS with  shortness of breath and altered mental status.  Upon arrival per EMS the patient's oxygen saturation was 60% and blood glucose 199. Patient was noncompliant with EMS and received 50 mg of ketamine IV.  Patient was diagnosed with COVID July 5th and tested negative for COVID a yesterday.  Patient presently is noted to have hypothermia with a rectal temp of 96° and persistent hypoxemia.  She is pulling off non-rebreather.  Staff reports patient had episode of diarrhea with bright red stool noted.   Laboratory data in the ED reveals significant evidence of dehydration with a sodium of 156 chloride 125 and CO2 11.  BUN creatinine 109 and 2.7 respectively.  Lactic acid 5.2.  WBC 21.74 Chest x-ray personally reviewed suspect early infiltrate.  Patient is noted to have encephalopathy and is only able to state name.   Information obtained from electronic health record and nursing home documents.        Past Medical History:   Diagnosis Date    Aortic insufficiency     Arthritis     Carotid artery occlusion     CKD (chronic kidney disease)     MI (myocardial infarction)        No past surgical history on file.    Review of patient's allergies indicates:  No Known Allergies    No current facility-administered medications on file prior to encounter.     Current Outpatient Medications on File Prior to Encounter   Medication Sig    aspirin 81 MG Chew Take 81 mg by mouth once daily.    clopidogrel (PLAVIX) 75 mg tablet Take 75 mg by mouth once daily.    docusate sodium (COLACE) 100 MG capsule Take 100 mg by mouth once daily.    hydrocodone-acetaminophen 5-325mg (NORCO) 5-325 mg per tablet Take 1 tablet by mouth 2 (two) times daily as needed for Pain.    isosorbide mononitrate (IMDUR) 30 MG 24 hr tablet Take 30 mg by mouth once daily.    metoprolol succinate (TOPROL-XL) 25 MG 24 hr tablet Take 25 mg by mouth once daily.     Family History    Mother and father .  Unable to obtained due to patient's current mental status.       Tobacco Use    Smoking status: Never Smoker    Smokeless tobacco: Not on file   Substance and Sexual Activity    Alcohol use: No    Drug use: No    Sexual activity: Not Currently     Review of Systems   Unable to perform ROS: Acuity of condition   Objective:     Vital Signs (Most Recent):  Temp: 96 °F (35.6 °C) (22)  Pulse: 75 (22)  Resp: 20 (22)  BP: (!) 133/58 (22)  SpO2: 100 % (22)   Vital Signs (24h Range):  Temp:  [96 °F (35.6 °C)-96.4 °F (35.8 °C)] 96 °F (35.6 °C)  Pulse:  [69-85] 75  Resp:  [20-47] 20  SpO2:  [95 %-100 %] 100 %  BP: ()/(32-75) 133/58        There is no height or weight on file to calculate BMI.    Physical Exam  Vitals and nursing note reviewed.   Constitutional:       General: She is in acute distress.       Appearance: She is well-developed. She is ill-appearing.      Comments: cachexia   HENT:      Head: Normocephalic.      Right Ear: External ear normal.      Left Ear: External ear normal.   Eyes:      Conjunctiva/sclera: Conjunctivae normal.      Pupils: Pupils are equal, round, and reactive to light.   Cardiovascular:      Rate and Rhythm: Normal rate and regular rhythm.      Heart sounds: Murmur heard.   Pulmonary:      Effort: Respiratory distress present.      Breath sounds: Normal breath sounds.   Abdominal:      General: Bowel sounds are normal.      Palpations: Abdomen is soft.      Tenderness: There is no abdominal tenderness.   Musculoskeletal:         General: Swelling and tenderness present. Normal range of motion.      Cervical back: Normal range of motion and neck supple.   Skin:     General: Skin is warm and dry.      Findings: Bruising present.   Neurological:      Mental Status: She is disoriented.      Motor: Weakness present.         CRANIAL NERVES     CN III, IV, VI   Pupils are equal, round, and reactive to light.     Significant Labs: All pertinent labs within the past 24 hours have been reviewed.  CBC:   Recent Labs   Lab 07/25/22  1832   WBC 21.74*   HGB 12.6   HCT 40.8        CMP:   Recent Labs   Lab 07/25/22 2001   *   K 4.9   *   CO2 11*   *   *   CREATININE 2.7*   CALCIUM 9.3   PROT 7.5   ALBUMIN 3.6   BILITOT 0.5   ALKPHOS 86   AST 37   ALT 26   ANIONGAP 20*   EGFRNONAA 14*     Lactic Acid:   Recent Labs   Lab 07/25/22 2001   LACTATE 5.2*     Troponin:   Recent Labs   Lab 07/25/22 2001   TROPONINI 0.112*       Significant Imaging: I have reviewed all pertinent imaging results/findings within the past 24 hours.    Assessment/Plan:     * Acute hypoxemic respiratory failure  Patient with Hypoxic respiratory failure which is Acute which is not related to a recent procedure.  she is not on home oxygen. Supplemental oxygen was provided and noted-  "Non-rebreather  % FiO2  Signs/symptoms of respiratory failure include- tachypnea, increased work of breathing, respiratory distress and wheezing. Contributing diagnoses includes - Aspiration and Pneumonia Labs and images were reviewed. Patient Has not has a recent ABG. Will treat underlying causes and adjust management of respiratory failure as follows- check abgs.  Patient on 100% non-rebreather presently.  Initiated on IV vancomycin and cefepime.     Patient does not wish intubation.  Wishes for BiPAP or CPAP if required.          Sepsis  This patient does have evidence of infective focus  My overall impression is sepsis. Vital signs were reviewed and noted in progress note.  Antibiotics given-   Antibiotics (From admission, onward)            Start     Stop Route Frequency Ordered    07/25/22 2215  cefepime in dextrose 5 % IVPB 2 g         -- IV Every 12 hours (non-standard times) 07/25/22 2107 07/25/22 2208  vancomycin - pharmacy to dose  (vancomycin IVPB)        "And" Linked Group Details    -- IV pharmacy to manage frequency 07/25/22 2108 07/25/22 1900  vancomycin in dextrose 5 % 1 gram/250 mL IVPB 1,000 mg         07/26 0659 IV ED 1 Time 07/25/22 1848        Cultures were taken-   Microbiology Results (last 7 days)     Procedure Component Value Units Date/Time    Urine culture [[id number]] Collected: 07/25/22 2041    Order Status: Sent Specimen: Urine, Catheterized Updated: 07/25/22 2042    Blood Culture #1 **CANNOT BE ORDERED STAT** [[id number]] Collected: 07/25/22 1658    Order Status: Sent Specimen: Blood Updated: 07/25/22 1846    Blood Culture #2 **CANNOT BE ORDERED STAT** [[id number]]     Order Status: No result Specimen: Blood         Latest lactate reviewed, they are-  Recent Labs   Lab 07/25/22 2001   LACTATE 5.2*       Organ dysfunction indicated by Acute kidney injury, Encephalopathy  and Acute respiratory failure  Source- suspect early pneumonia.  Patient with severe dehydration encephalopathy " with metabolic acidosis and elevated lactate.    Source control Achieved by- initiated on IV hydration, bicarb drip x1, IV antibiotics including cefepime and vancomycin. Trend lactic acid        ACP (advance care planning)  Advance Care Planning     Date: 07/25/2022    [facility]  I engaged the family and healthcare power of   in a conversation about advance care planning and we specifically addressed what the goals of care would be moving forward, in light of the patient's change in clinical status, specifically patient with overall very poor prognosis with high mortality rate in the setting of severe metabolic acidosis, acute respiratory failure encephalopathy and acute kidney injury.  We did specifically address the patient's likely prognosis, which is poor.  We explored the patient's values and preferences for future care.  The family and healthcare power of   endorses that what is most important right now is to focus on spending time at home, avoiding the hospital and symptom/pain control    Accordingly, we have decided that the best plan to meet the patient's goals includes continuing with treatment    I did not explain the role for hospice care at this stage of the patient's illness, including its ability to help the patient live with the best quality of life possible.  We will not be making a hospice referral.    I spent a total of 30 minutes engaging the patient in this advance care planning discussion.    Discussed with [place] hewitt patient's prognosis is poor with high mortality rate.  Patient is with imminent death.  Discussed with nephew will initiate IV fluids, IV antibiotics, and BiPAP CPAP per patient's [place] post document.                Aspiration pneumonia  Suspected early aspiration pneumonia.  Initiated on cefepime and vancomycin.      MICHAEL (acute kidney injury)  Patient with acute kidney injury likely due to IVVD/dehydration MICHAEL is currently worsening. Labs reviewed- Renal  function/electrolytes with CrCl cannot be calculated (Unknown ideal weight.). according to latest data. Monitor urine output and serial BMP and adjust therapy as needed. Avoid nephrotoxins and renally dose meds for GFR listed above.       Encephalopathy, metabolic  See sepsis       Coronary artery disease  Patient with known CAD s/p stent placement, which is controlled Will continue ASA, Plavix and Statin and monitor for S/Sx of angina/ACS. Continue to monitor on telemetry.   Hold aspirin and Plavix and all oral medications at this time due to current mental status and blood noted in stool.        VTE Risk Mitigation (From admission, onward)         Ordered     IP VTE HIGH RISK PATIENT  Once         07/25/22 2017     Place sequential compression device  Until discontinued         07/25/22 2017                   Renetta Pagan NP  Department of Hospital Medicine   [facility] - Emergency Dept

## 2022-07-26 NOTE — SUBJECTIVE & OBJECTIVE
Interval History: Was able to relate her name to me but otherwise not oriented to answer questions. Per treatment team she is oriented to self at NH. Noted significant drop in Hb, still remains significantly hypernatremic, renal failure, lactic acidosis    Review of Systems   Unable to perform ROS: Dementia   Objective:     Vital Signs (Most Recent):  Temp: 97.6 °F (36.4 °C) (07/26/22 1145)  Pulse: 88 (07/26/22 1145)  Resp: 18 (07/26/22 1145)  BP: (!) 114/53 (07/26/22 1145)  SpO2: (!) 94 % (07/26/22 1145)   Vital Signs (24h Range):  Temp:  [96 °F (35.6 °C)-98.3 °F (36.8 °C)] 97.6 °F (36.4 °C)  Pulse:  [69-92] 88  Resp:  [18-47] 18  SpO2:  [94 %-100 %] 94 %  BP: ()/() 114/53     Weight: 44.5 kg (98 lb 1.7 oz)  Body mass index is 18.54 kg/m².    Intake/Output Summary (Last 24 hours) at 7/26/2022 1411  Last data filed at 7/25/2022 2019  Gross per 24 hour   Intake 50 ml   Output --   Net 50 ml      Physical Exam  Vitals and nursing note reviewed.   Constitutional:       General: She is in acute distress.      Appearance: She is well-developed. She is ill-appearing.      Comments: cachexia   HENT:      Head: Normocephalic.      Right Ear: External ear normal.      Left Ear: External ear normal.   Eyes:      Conjunctiva/sclera: Conjunctivae normal.      Pupils: Pupils are equal, round, and reactive to light.   Cardiovascular:      Rate and Rhythm: Normal rate and regular rhythm.      Heart sounds: Murmur heard.   Pulmonary:      Effort: Respiratory distress present.      Breath sounds: Normal breath sounds.   Abdominal:      General: Bowel sounds are normal.      Palpations: Abdomen is soft.      Tenderness: There is no abdominal tenderness.   Musculoskeletal:         General: Swelling and tenderness present. Normal range of motion.      Cervical back: Normal range of motion and neck supple.   Skin:     General: Skin is warm and dry.      Findings: Bruising present.   Neurological:      Mental Status: She is  disoriented.      Motor: Weakness present.       Significant Labs: All pertinent labs within the past 24 hours have been reviewed.    Significant Imaging: I have reviewed all pertinent imaging results/findings within the past 24 hours.

## 2022-07-26 NOTE — SUBJECTIVE & OBJECTIVE
Interval History: Patient lying in bed, confused, appears uncomfortable.     Past Medical History:   Diagnosis Date    Aortic insufficiency     Arthritis     Carotid artery occlusion     CKD (chronic kidney disease)     MI (myocardial infarction)        No past surgical history on file.    Review of patient's allergies indicates:  No Known Allergies    Medications:  Continuous Infusions:   dextrose 5 % and 0.45 % NaCl 75 mL/hr at 07/26/22 1225     Scheduled Meds:   ceFEPime (MAXIPIME) IVPB  500 mg Intravenous Q24H    pantoprazole  40 mg Intravenous Daily     PRN Meds:acetaminophen, albuterol-ipratropium, aluminum-magnesium hydroxide-simethicone, glucagon (human recombinant), glucose, glucose, HYDROcodone-acetaminophen, magnesium oxide, magnesium oxide, melatonin, naloxone, ondansetron, prochlorperazine, senna-docusate 8.6-50 mg, simethicone, sodium chloride 0.9%, Pharmacy to dose Vancomycin consult **AND** vancomycin - pharmacy to dose    Family History    None       Tobacco Use    Smoking status: Never Smoker    Smokeless tobacco: Not on file   Substance and Sexual Activity    Alcohol use: No    Drug use: No    Sexual activity: Not Currently       Review of Systems   Unable to perform ROS: Mental status change   Objective:     Vital Signs (Most Recent):  Temp: 97.6 °F (36.4 °C) (07/26/22 1145)  Pulse: 88 (07/26/22 1145)  Resp: 18 (07/26/22 1145)  BP: (!) 114/53 (07/26/22 1145)  SpO2: 97 % (07/26/22 0803)   Vital Signs (24h Range):  Temp:  [96 °F (35.6 °C)-98.3 °F (36.8 °C)] 97.6 °F (36.4 °C)  Pulse:  [69-92] 88  Resp:  [18-47] 18  SpO2:  [95 %-100 %] 97 %  BP: ()/() 114/53     Weight: 44.5 kg (98 lb 1.7 oz)  Body mass index is 18.54 kg/m².    Physical Exam  Constitutional:       General: She is in acute distress.      Appearance: She is ill-appearing (Chronically and acutely ill appearing).   Cardiovascular:      Rate and Rhythm: Normal rate.   Pulmonary:      Effort: Respiratory distress present.    Neurological:      Mental Status: She is alert.      Comments: Able to state name        Review of Symptoms      Symptom Assessment (ESAS 0-10 Scale)  Unable to complete assessment due to Mental status change       Pain Assessment in Advanced Demential Scale (PAINAD)   Breathing - Independent of vocalization:  1  Negative vocalization:  1  Facial expression:  1  Body language:  1  Consolability:  0  Total:  4    Performance Status:  40    Living Arrangements:  Lives in nursing home      Advance Care Planning   Advance Directives:   Do Not Resuscitate Status: Yes      Decision Making:  Family answered questions     All pertinent labs within the past 24 hours have been reviewed.  Significant Labs:   CBC:   Recent Labs   Lab 07/26/22 0404   WBC 15.76*   HGB 9.3*   HCT 30.0*   *        BMP:  Recent Labs   Lab 07/26/22 0404   *   *   K 4.1   *   CO2 16*   BUN 93*   CREATININE 2.2*   CALCIUM 7.6*   MG 2.0     LFT:  Lab Results   Component Value Date    AST 26 07/26/2022    ALKPHOS 63 07/26/2022    BILITOT 0.4 07/26/2022     Albumin:   Albumin   Date Value Ref Range Status   07/26/2022 2.6 (L) 3.5 - 5.2 g/dL Final     Protein:   Total Protein   Date Value Ref Range Status   07/26/2022 5.3 (L) 6.0 - 8.4 g/dL Final     Lactic acid:   Lab Results   Component Value Date    LACTATE 2.7 (H) 07/26/2022    LACTATE 2.8 (H) 07/25/2022       Significant Imaging: I have reviewed all pertinent imaging results/findings within the past 24 hours.

## 2022-07-26 NOTE — PLAN OF CARE
During my shift, pt AAOX, 1, NAD. BP wnl. SPO2 wnl on 10L of O2 via HFNC. Unable to obtain temperature. Pt's niece, Abbey, at bedside. Incontinent care provided. Large, liquid, bloody stools noted. Pt turned q2 hrs. IV fluids infusing as ordered. Skin intact. Bed is lowered and locked. Call light is within reach. Pt has no known needs at this time.

## 2022-07-26 NOTE — ASSESSMENT & PLAN NOTE
Patient with Hypoxic respiratory failure which is Acute which is not related to a recent procedure.  she is not on home oxygen. Supplemental oxygen was provided and noted- Non-rebreather  % FiO2  Signs/symptoms of respiratory failure include- tachypnea, increased work of breathing, respiratory distress and wheezing. Contributing diagnoses includes - Aspiration and Pneumonia Labs and images were reviewed. Patient Has not has a recent ABG. Will treat underlying causes and adjust management of respiratory failure as follows- check abgs.  Patient on 100% non-rebreather presently.  Initiated on IV vancomycin and cefepime.     Patient does not wish intubation.  Wishes for BiPAP or CPAP if required.

## 2022-07-26 NOTE — PROGRESS NOTES
Pharmacokinetic Initial Assessment: IV Vancomycin    Assessment/Plan:    Initiate intravenous vancomycin with loading dose of 1000 mg once with subsequent doses when random concentrations are less than 20 mcg/mL  Desired empiric serum trough concentration is 10 to 20 mcg/mL  Draw vancomycin random level on 7/26/22 at 2100.  Pharmacy will continue to follow and monitor vancomycin.      Please contact pharmacy at extension [phone] with any questions regarding this assessment.     Thank you for the consult,   Ben Peguero       Patient brief summary:  Abbey Ratliff is a 89 y.o. female initiated on antimicrobial therapy with IV Vancomycin for treatment of suspected sepsis    Drug Allergies:   Review of patient's allergies indicates:  No Known Allergies    Actual Body Weight:   44.5kg    Renal Function:   Estimated Creatinine Clearance: 7.2 mL/min (A) (based on SCr of 2.7 mg/dL (H)).,     Dialysis Method (if applicable):  N/A    CBC (last 72 hours):  Recent Labs   Lab Result Units 07/25/22  1832   WBC K/uL 21.74*   Hemoglobin g/dL 12.6   Hematocrit % 40.8   Platelets K/uL 206   Gran % % 93.4*   Lymph % % 2.9*   Mono % % 2.9*   Eosinophil % % 0.0   Basophil % % 0.3   Differential Method  Automated       Metabolic Panel (last 72 hours):  Recent Labs   Lab Result Units 07/25/22 2001 07/25/22  2041   Sodium mmol/L 156*  --    Potassium mmol/L 4.9  --    Chloride mmol/L 125*  --    CO2 mmol/L 11*  --    Glucose mg/dL 154*  --    Glucose, UA   --  Negative   BUN mg/dL 109*  --    Creatinine mg/dL 2.7*  --    Albumin g/dL 3.6  --    Total Bilirubin mg/dL 0.5  --    Alkaline Phosphatase U/L 86  --    AST U/L 37  --    ALT U/L 26  --        Drug levels (last 3 results):  No results for input(s): VANCOMYCINRA, VANCORANDOM, VANCOMYCINPE, VANCOPEAK, VANCOMYCINTR, VANCOTROUGH in the last 72 hours.    Microbiologic Results:  Microbiology Results (last 7 days)       Procedure Component Value Units Date/Time    Urine culture  [[id number]] Collected: 07/25/22 2041    Order Status: Sent Specimen: Urine, Catheterized Updated: 07/25/22 2314    Blood Culture #1 **CANNOT BE ORDERED STAT** [[id number]] Collected: 07/25/22 1658    Order Status: Sent Specimen: Blood Updated: 07/25/22 1846    Blood Culture #2 **CANNOT BE ORDERED STAT** [[id number]]     Order Status: No result Specimen: Blood

## 2022-07-26 NOTE — PLAN OF CARE
POC reviewed w/ pt and pt's family. Cardiac monitor maintained. VSS on 10 L high flow NC. No complaints/ indications of pain. Turn Q2/frequent weight shift assistance provided. Instructed to call for help ambulating. No injuries, falls, or trauma occurred during shift. Purposeful rounding completed. Bed alarm set, bed low and locked, side rails up x3, call light within reach.

## 2022-07-27 ENCOUNTER — PATIENT OUTREACH (OUTPATIENT)
Dept: ADMINISTRATIVE | Facility: OTHER | Age: 87
End: 2022-07-27
Payer: MEDICARE

## 2022-07-27 PROBLEM — E43 SEVERE PROTEIN-CALORIE MALNUTRITION: Chronic | Status: ACTIVE | Noted: 2022-07-27

## 2022-07-27 LAB
ALBUMIN SERPL BCP-MCNC: 2.7 G/DL (ref 3.5–5.2)
ALP SERPL-CCNC: 85 U/L (ref 55–135)
ALT SERPL W/O P-5'-P-CCNC: 17 U/L (ref 10–44)
ANION GAP SERPL CALC-SCNC: 14 MMOL/L (ref 8–16)
ANION GAP SERPL CALC-SCNC: 8 MMOL/L (ref 8–16)
AST SERPL-CCNC: 28 U/L (ref 10–40)
BACTERIA UR CULT: NO GROWTH
BASOPHILS # BLD AUTO: 0.04 K/UL (ref 0–0.2)
BASOPHILS NFR BLD: 0.3 % (ref 0–1.9)
BILIRUB SERPL-MCNC: 0.4 MG/DL (ref 0.1–1)
BUN SERPL-MCNC: 51 MG/DL (ref 10–30)
BUN SERPL-MCNC: 60 MG/DL (ref 10–30)
BUN SERPL-MCNC: 60 MG/DL (ref 10–30)
BUN SERPL-MCNC: 64 MG/DL (ref 10–30)
BUN SERPL-MCNC: 75 MG/DL (ref 10–30)
CALCIUM SERPL-MCNC: 8.6 MG/DL (ref 8.7–10.5)
CALCIUM SERPL-MCNC: 8.7 MG/DL (ref 8.7–10.5)
CALCIUM SERPL-MCNC: 8.7 MG/DL (ref 8.7–10.5)
CALCIUM SERPL-MCNC: 8.8 MG/DL (ref 8.7–10.5)
CALCIUM SERPL-MCNC: 8.8 MG/DL (ref 8.7–10.5)
CHLORIDE SERPL-SCNC: 119 MMOL/L (ref 95–110)
CHLORIDE SERPL-SCNC: 121 MMOL/L (ref 95–110)
CHLORIDE SERPL-SCNC: 121 MMOL/L (ref 95–110)
CHLORIDE SERPL-SCNC: 123 MMOL/L (ref 95–110)
CHLORIDE SERPL-SCNC: 130 MMOL/L (ref 95–110)
CO2 SERPL-SCNC: 15 MMOL/L (ref 23–29)
CO2 SERPL-SCNC: 19 MMOL/L (ref 23–29)
CO2 SERPL-SCNC: 21 MMOL/L (ref 23–29)
CO2 SERPL-SCNC: 21 MMOL/L (ref 23–29)
CO2 SERPL-SCNC: 24 MMOL/L (ref 23–29)
CREAT SERPL-MCNC: 1.6 MG/DL (ref 0.5–1.4)
CREAT SERPL-MCNC: 1.7 MG/DL (ref 0.5–1.4)
CREAT SERPL-MCNC: 1.7 MG/DL (ref 0.5–1.4)
CREAT SERPL-MCNC: 1.8 MG/DL (ref 0.5–1.4)
CREAT SERPL-MCNC: 2 MG/DL (ref 0.5–1.4)
DIFFERENTIAL METHOD: ABNORMAL
EOSINOPHIL # BLD AUTO: 0.1 K/UL (ref 0–0.5)
EOSINOPHIL NFR BLD: 0.8 % (ref 0–8)
ERYTHROCYTE [DISTWIDTH] IN BLOOD BY AUTOMATED COUNT: 14.7 % (ref 11.5–14.5)
EST. GFR  (AFRICAN AMERICAN): 23 ML/MIN/1.73 M^2
EST. GFR  (AFRICAN AMERICAN): 26 ML/MIN/1.73 M^2
EST. GFR  (AFRICAN AMERICAN): 28 ML/MIN/1.73 M^2
EST. GFR  (AFRICAN AMERICAN): 28 ML/MIN/1.73 M^2
EST. GFR  (AFRICAN AMERICAN): 30 ML/MIN/1.73 M^2
EST. GFR  (NON AFRICAN AMERICAN): 20 ML/MIN/1.73 M^2
EST. GFR  (NON AFRICAN AMERICAN): 22 ML/MIN/1.73 M^2
EST. GFR  (NON AFRICAN AMERICAN): 24 ML/MIN/1.73 M^2
EST. GFR  (NON AFRICAN AMERICAN): 24 ML/MIN/1.73 M^2
EST. GFR  (NON AFRICAN AMERICAN): 26 ML/MIN/1.73 M^2
GLUCOSE SERPL-MCNC: 142 MG/DL (ref 70–110)
GLUCOSE SERPL-MCNC: 143 MG/DL (ref 70–110)
GLUCOSE SERPL-MCNC: 149 MG/DL (ref 70–110)
GLUCOSE SERPL-MCNC: 149 MG/DL (ref 70–110)
GLUCOSE SERPL-MCNC: 165 MG/DL (ref 70–110)
HCT VFR BLD AUTO: 29.4 % (ref 37–48.5)
HGB BLD-MCNC: 9.5 G/DL (ref 12–16)
IMM GRANULOCYTES # BLD AUTO: 0.14 K/UL (ref 0–0.04)
IMM GRANULOCYTES NFR BLD AUTO: 0.9 % (ref 0–0.5)
LYMPHOCYTES # BLD AUTO: 1.2 K/UL (ref 1–4.8)
LYMPHOCYTES NFR BLD: 7.5 % (ref 18–48)
MAGNESIUM SERPL-MCNC: 1.9 MG/DL (ref 1.6–2.6)
MCH RBC QN AUTO: 32.5 PG (ref 27–31)
MCHC RBC AUTO-ENTMCNC: 32.3 G/DL (ref 32–36)
MCV RBC AUTO: 101 FL (ref 82–98)
MONOCYTES # BLD AUTO: 0.6 K/UL (ref 0.3–1)
MONOCYTES NFR BLD: 4.1 % (ref 4–15)
NEUTROPHILS # BLD AUTO: 13.5 K/UL (ref 1.8–7.7)
NEUTROPHILS NFR BLD: 86.4 % (ref 38–73)
NRBC BLD-RTO: 0 /100 WBC
OB PNL STL: POSITIVE
PHOSPHATE SERPL-MCNC: 3.1 MG/DL (ref 2.7–4.5)
PLATELET # BLD AUTO: 132 K/UL (ref 150–450)
PMV BLD AUTO: 14.4 FL (ref 9.2–12.9)
POTASSIUM SERPL-SCNC: 3.2 MMOL/L (ref 3.5–5.1)
POTASSIUM SERPL-SCNC: 3.3 MMOL/L (ref 3.5–5.1)
POTASSIUM SERPL-SCNC: 3.3 MMOL/L (ref 3.5–5.1)
POTASSIUM SERPL-SCNC: 3.8 MMOL/L (ref 3.5–5.1)
POTASSIUM SERPL-SCNC: 4.4 MMOL/L (ref 3.5–5.1)
PROT SERPL-MCNC: 5.9 G/DL (ref 6–8.4)
RBC # BLD AUTO: 2.92 M/UL (ref 4–5.4)
SODIUM SERPL-SCNC: 151 MMOL/L (ref 136–145)
SODIUM SERPL-SCNC: 156 MMOL/L (ref 136–145)
SODIUM SERPL-SCNC: 159 MMOL/L (ref 136–145)
VANCOMYCIN SERPL-MCNC: 13.3 UG/ML
WBC # BLD AUTO: 15.6 K/UL (ref 3.9–12.7)

## 2022-07-27 PROCEDURE — 36415 COLL VENOUS BLD VENIPUNCTURE: CPT | Performed by: INTERNAL MEDICINE

## 2022-07-27 PROCEDURE — 99233 SBSQ HOSP IP/OBS HIGH 50: CPT | Mod: 95,,, | Performed by: FAMILY MEDICINE

## 2022-07-27 PROCEDURE — 80202 ASSAY OF VANCOMYCIN: CPT | Performed by: INTERNAL MEDICINE

## 2022-07-27 PROCEDURE — 36415 COLL VENOUS BLD VENIPUNCTURE: CPT | Performed by: NURSE PRACTITIONER

## 2022-07-27 PROCEDURE — 11000001 HC ACUTE MED/SURG PRIVATE ROOM

## 2022-07-27 PROCEDURE — S5010 5% DEXTROSE AND 0.45% SALINE: HCPCS | Performed by: INTERNAL MEDICINE

## 2022-07-27 PROCEDURE — 92610 EVALUATE SWALLOWING FUNCTION: CPT

## 2022-07-27 PROCEDURE — 80053 COMPREHEN METABOLIC PANEL: CPT | Performed by: NURSE PRACTITIONER

## 2022-07-27 PROCEDURE — 63600175 PHARM REV CODE 636 W HCPCS

## 2022-07-27 PROCEDURE — 27000221 HC OXYGEN, UP TO 24 HOURS

## 2022-07-27 PROCEDURE — 83735 ASSAY OF MAGNESIUM: CPT | Performed by: NURSE PRACTITIONER

## 2022-07-27 PROCEDURE — 25000003 PHARM REV CODE 250: Performed by: INTERNAL MEDICINE

## 2022-07-27 PROCEDURE — 99233 PR SUBSEQUENT HOSPITAL CARE,LEVL III: ICD-10-PCS | Mod: 95,,, | Performed by: FAMILY MEDICINE

## 2022-07-27 PROCEDURE — 85025 COMPLETE CBC W/AUTO DIFF WBC: CPT | Performed by: NURSE PRACTITIONER

## 2022-07-27 PROCEDURE — 80048 BASIC METABOLIC PNL TOTAL CA: CPT | Mod: XB | Performed by: INTERNAL MEDICINE

## 2022-07-27 PROCEDURE — 84100 ASSAY OF PHOSPHORUS: CPT | Performed by: NURSE PRACTITIONER

## 2022-07-27 PROCEDURE — C9113 INJ PANTOPRAZOLE SODIUM, VIA: HCPCS | Performed by: INTERNAL MEDICINE

## 2022-07-27 PROCEDURE — 94761 N-INVAS EAR/PLS OXIMETRY MLT: CPT

## 2022-07-27 PROCEDURE — 63600175 PHARM REV CODE 636 W HCPCS: Performed by: INTERNAL MEDICINE

## 2022-07-27 RX ORDER — DEXTROSE MONOHYDRATE 50 MG/ML
INJECTION, SOLUTION INTRAVENOUS ONCE
Status: COMPLETED | OUTPATIENT
Start: 2022-07-27 | End: 2022-07-27

## 2022-07-27 RX ORDER — CEFEPIME HYDROCHLORIDE 1 G/50ML
1 INJECTION, SOLUTION INTRAVENOUS
Status: DISCONTINUED | OUTPATIENT
Start: 2022-07-27 | End: 2022-07-29 | Stop reason: HOSPADM

## 2022-07-27 RX ADMIN — DEXTROSE AND SODIUM CHLORIDE: 5; .45 INJECTION, SOLUTION INTRAVENOUS at 01:07

## 2022-07-27 RX ADMIN — VANCOMYCIN HYDROCHLORIDE 500 MG: 500 INJECTION, POWDER, LYOPHILIZED, FOR SOLUTION INTRAVENOUS at 12:07

## 2022-07-27 RX ADMIN — DEXTROSE: 5 SOLUTION INTRAVENOUS at 09:07

## 2022-07-27 RX ADMIN — PANTOPRAZOLE SODIUM 40 MG: 40 INJECTION, POWDER, FOR SOLUTION INTRAVENOUS at 09:07

## 2022-07-27 NOTE — PLAN OF CARE
POC reviewed w/ pt and pt's family. VSS on 4 L NC. No complaints/ indications of pain. Turn Q2/frequent weight shift assistance provided. Instructed to call for help ambulating. No injuries, falls, or trauma occurred during shift. Purposeful rounding completed. Bed alarm set, bed low and locked, side rails up x3, call light within reach.

## 2022-07-27 NOTE — PROGRESS NOTES
This lady is stable without acute visceral signs and she has no acute respiratory issues at this time. She has bowel urgency though no massive blood loss and hemodynamically she is stable. No nausea or emesis.     T<100 HR 78  Anicteric no temporal wasting  Neck supple no mass  Heart RR no gallop  Chest few upper noises no wheeze  Abdomen soft active sounds no mass ascites or tenderness  Extremities no cyanosis or clubbing  Neuro awake confused though can answer simple questions    Labs: H/H very stable  Stool negative for C. diff    Continue following labs and support nutritionally. No plans for endoscopy at this time.

## 2022-07-27 NOTE — PT/OT/SLP EVAL
Speech Language Pathology Evaluation  Bedside Swallow    Patient Name:  Abbey Ratliff   MRN:  [id number]  Admitting Diagnosis: Acute hypoxemic respiratory failure    Recommendations:                 General Recommendations: SLP to continue to follow for ongoing assessment of diet tolerance and s/s of aspiration     Diet recommendations: Solids: Mechanical soft, Finely chopped meat, Liquids: Thin     Aspiration Precautions: 1:1 assistance with meals    1. 3-5ml sips of thin liquids via spoon only  2. NO STRAWS  3. 1 bite/sip at a time  4. Assistance with meals  5. Double swallow with each bite/sip  6. Feed only when awake/alert  7. HOB to 90 degrees  8. Monitor for s/s of aspiration   9. Small bites/sips   10. Eliminate distractions while eating, do not feed if pt is unable to attend to oral feeding task safely     General Precautions: Standard, aspiration, fall     History:     HPI per MD: Abbey Ratliff is a 89 y.o. female with PMHx of carotid artery occlusion, aortic insufficiency, CKD, and prior MI who presents from her nursing home via EMS with  shortness of breath and altered mental status.  Upon arrival per EMS the patient's oxygen saturation was 60% and blood glucose 199. Patient was noncompliant with EMS and received 50 mg of ketamine IV.  Patient was diagnosed with COVID July 5th and tested negative for COVID a yesterday.  Patient presently is noted to have hypothermia with a rectal temp of 96° and persistent hypoxemia.  She is pulling off non-rebreather.  Staff reports patient had episode of diarrhea with bright red stool noted.   Laboratory data in the ED reveals significant evidence of dehydration with a sodium of 156 chloride 125 and CO2 11.  BUN creatinine 109 and 2.7 respectively.  Lactic acid 5.2.  WBC 21.74 Chest x-ray personally reviewed suspect early infiltrate.  Patient is noted to have encephalopathy and is only able to state name.  Information obtained from electronic health record and nursing home  documents.    Past Medical History:   Diagnosis Date    Aortic insufficiency     Arthritis     Carotid artery occlusion     CKD (chronic kidney disease)     MI (myocardial infarction)        No past surgical history on file.    Chest X-Rays 7/25/22 per MD report: Enlargement of the cardiac silhouette.  No focal consolidation.    Subjective     RN reporting pt coughing with intake of thin liquids this date. SLP in to assess swallowing, pt sleeping.      Pain/Comfort:  · Pain Rating 1: other (see comments) (pt cognitive status impacting ability to communicate, however no pain behaviors demonstrated)    Respiratory support: 4L nasal cannula     Objective:     Cognitive Communication Status: Pt awake, confusional state noted. Pt highly distractible, required constant redirection to participate in meal. Pt attempting to get out of bed every 15-30 seconds. Unable to attend to oral feeding task for longer than 5-10 seconds at a time. Encephalopathy noted in history. Pt requires caregiver to assure safety is maintained and wants and needs are met 100% of time.     Oral Musculature Evaluation  · Oral Musculature: unable to assess due to poor participation/comprehension  · Dentition: scattered dentition  · Voice Prior to PO Intake: clear, normal volume  · Oral Musculature Comments: Face appears symmetrical at rest. Unable to assess pt's oral motor function due to cognitive status, difficulty to assess in function due to distractibility and dcr ability to follow commands.    Bedside Swallow Eval:   Consistencies Assessed:  · Thin liquids 3-5ml via spoon   · Puree 1tsp bites yogurt   · Solids single bite pork chop     Oral Phase:   · dcr lip seal   · dcr cohesive bolus formation   · Excessive mastication across all consistencies, oral holding across all consistencies   · Frequently clamping down on utensils   · Frequently talking while eating   · Unable to redirect pt during meal this date     Pharyngeal Phase:   · Trigger  of pharyngeal swallow appears to be delayed   · Minimal intake achieved this session due to cognitive status   · Pt demonstrating inconsistent s/s of airway threat: inconsistently coughing across all consistencies while oral holding and talking during intake   · Pt vocal quality remained clear, however will require ongoing assessment to assure safe oral diet is maintained     Compensatory Strategies  · Requires caregiver assistance for oral intake: recommend small bites and sips, thin liquids via spoon only     Assessment:     Abbey Ratliff is a 89 y.o. female with an SLP diagnosis of dysphagia due to cognitive communication status. Discussed aspiration precautions with care team and for ongoing assessment of swallow function.     Goals:   Multidisciplinary Problems     SLP Goals        Problem: SLP    Goal Priority Disciplines Outcome   SLP Goal     SLP Ongoing, Progressing   Description: 1. Pt will be able to consume mechanical soft solids with no overt s/s of airway threat or aspiration given caregiver assistance to monitor safe rate and volume of intake.   2. Pt will be able to consume 3-5ml amount of thin liquids via spoon with no overt s/s of airway threat or aspiration given caregiver assistance to monitor safe rate and volume of intake and initiate timely swallow.                    Plan:     · Patient to be seen:  3 x/week, 5 x/week   · Plan of Care expires:  08/10/22  · Plan of Care reviewed with:  other (see comments) (RN, MD)   · SLP Follow-Up:  Yes       Discharge recommendations:  other (see comments) (family deciding nursing home with hospice care)       Time Tracking:     SLP Treatment Date:   07/27/22  Speech Start Time:  1422  Speech Stop Time:  1438     Speech Total Time (min):  16 min    Billable Minutes: Eval Swallow and Oral Function 16 mins    07/27/2022

## 2022-07-27 NOTE — PROGRESS NOTES
Pharmacist Renal Dose Adjustment Note    Abbey Ratliff is a 89 y.o. female being treated with the medication Cefepime.    Patient Data:    Vital Signs (Most Recent):  Temp: 96.6 °F (35.9 °C) (07/27/22 0757)  Pulse: 95 (07/27/22 0757)  Resp: 20 (07/27/22 0757)  BP: 134/70 (07/27/22 0757)  SpO2: 98 % (07/27/22 0757) Vital Signs (72h Range):  Temp:  [96 °F (35.6 °C)-99.4 °F (37.4 °C)]   Pulse:  [69-99]   Resp:  [16-47]   BP: ()/()   SpO2:  [94 %-100 %]      Recent Labs   Lab 07/26/22  1609 07/26/22  2338 07/27/22  0453   CREATININE 2.0* 2.0* 1.8*     Serum creatinine: 1.8 mg/dL (H) 07/27/22 0453  Estimated creatinine clearance: 10.8 mL/min (A)    Medication:Cefepime dose: 500 mg frequency every 24 hours will be changed to medication:Cefepime dose:1000mg frequency:every 24 hours.    Pharmacist's Name: Loreto Segovia  Pharmacist's Extension: [id number]

## 2022-07-27 NOTE — PROGRESS NOTES
"[facility] ([place])  Palliative Medicine  Progress Note    Patient Name: Abbey Ratliff  MRN: [id number]  Admission Date: 7/25/2022  Hospital Length of Stay: 1 days  Code Status: DNR   Attending Provider: Kaushal Bonilla MD  Consulting Provider: Gina Alexandra DNP  Primary Care Physician: [facility]  Principal Problem:Acute hypoxemic respiratory failure    Patient information was obtained from patient, relative(s) and primary team.      Assessment/Plan:     * Acute hypoxemic respiratory failure  - Noted; patient currently on HFNC, per the nursing home that patient was apparently on oxygen for the last few days.     ACP (advance care planning)  7/27/22  - I attempted to contact patients Abbey ames, unable to reach her.   - I spoke to the patients nephew Donis. He reports he was able to obtain POA documents and he is designated as financial POA but not healthcare, he reports there is no designation for HCPOA. Yesterday, Abbey, discussed hospice support with the RN. I discussed with Donis the current condition of the patient and that she appears very agitated and uncomfortable. We also discussed patients poor nutritional status and that she is not eating or drinking much. We discussed focusing on comfort and symptom management with hospice support. Donis reports he has spoke to the patients hui and they desire NH with hospice.   - Would recommend low dose ativan for agitation   - CM to arrange NH with hospice.     7/26/22  - Consult placed for advance care planning/ goals of care in elderly, frail patient admitted with respiratory failure. She was recently diagnosed with COVID. When I met the patient, she was lying in bed, appeared SOB, was taking oxygen off. She was able to state her name, however kept saying "help me, help me".  - I contacted [facility], she has been a resident there for a long time. She does not have a HCPOA on file, however the patients nephew, Donis, has stated he is " SEVERO but has not provided documents yet.   - I contacted Donis, he is the patients nephew. The patient has been a resident at [facility] for several years. She has no children. She has several nieces and nephews. Donis does not live in [place]. We discussed the patients current condition and that her prognosis is guarded, discussed she is having rectal bleeding and is on HFNC. He is planning to come in to town on Thursday or Friday. His care preferences for Ms. Ratliff are to focus on acute/ reversible conditions. I did discuss with him that the patient is very frail and elderly and she may decline pretty quickly, he is aware of this. He reiterated the patients wishes for DNR/ DNI. Donis Hopkins ([phone]) reports he is HCPOA, the paperwork is at his office and he will fax it to me when it is available. Of note, Donis reports [place] Paul is the patients ex nephew, he was  to the patients niece. However, [place] lives locally and has been very active in helping to care for Ms. Ratliff.   - I attempted to contact patients hui, Abbey, unable to reach her.  - I contacted [place] Paul. We discussed patients current plan of care and that prognosis is guarded. He asked if he should have family visit, I portrayed my transparent concern that Ms. Ratliff status is very guarded and she may decline quickly and if visiting is important to family then I would advise this. [place] understands that the patient is very frail and elderly and very acutely ill. [place] reiterated that Donis if HCPOA  - For now, continue current level of care, patient DNR/ DNI. Family is aware of patients guarded prognosis and if patients status acutely changes, please reach out to family immediately.     Aspiration pneumonia  - Noted; management per primary team     Encephalopathy, metabolic  - Noted; patient with AMS on arrival  - Per nursing home, at baseline patient is alert and able to state her name but largely confused           Subjective:  "    Chief Complaint:   Chief Complaint   Patient presents with    Shortness of Breath     Sent from nursing home for SOB, back pain and agitation. Diagnosed with COVID 1 month ago. Given 50 mg ketamine IV per EMS pta. dui    Altered Mental Status     Pt started to have diarrhea and became altered. today Per EMS pt was hypoxic in the 60s and was given assisted ventilation with the BVM. Pulse detected. Pt was not responsive to stimulation. Pt improved O2 >93% with 15L NRB. Blood glucose normal with EMS. DNR.     Diarrhea       HPI:   Per H&P: "HPI: Abbey Ratliff is a 89 y.o. female with PMHx of carotid artery occlusion, aortic insufficiency, CKD, and prior MI who presents from her nursing home via EMS with  shortness of breath and altered mental status.  Upon arrival per EMS the patient's oxygen saturation was 60% and blood glucose 199. Patient was noncompliant with EMS and received 50 mg of ketamine IV.  Patient was diagnosed with COVID July 5th and tested negative for COVID a yesterday.  Patient presently is noted to have hypothermia with a rectal temp of 96° and persistent hypoxemia.  She is pulling off non-rebreather.  Staff reports patient had episode of diarrhea with bright red stool noted.   Laboratory data in the ED reveals significant evidence of dehydration with a sodium of 156 chloride 125 and CO2 11.  BUN creatinine 109 and 2.7 respectively.  Lactic acid 5.2.  WBC 21.74 Chest x-ray personally reviewed suspect early infiltrate.  Patient is noted to have encephalopathy and is only able to state name.  Information obtained from electronic health record and nursing home documents."    At time of initial consult, patient lying in bed, she is confused. Palliative medicine consulted for goals of care/ advance care planning.       Hospital Course:  No notes on file    Interval History: Lying in bed, confused, appears uncomfortable, yelling "help me"    Medications:  Continuous Infusions:  Scheduled Meds:   " ceFEPime (MAXIPIME) IVPB  1 g Intravenous Q24H    pantoprazole  40 mg Intravenous Daily     PRN Meds:acetaminophen, albuterol-ipratropium, aluminum-magnesium hydroxide-simethicone, glucagon (human recombinant), glucose, glucose, HYDROcodone-acetaminophen, magnesium oxide, magnesium oxide, melatonin, naloxone, ondansetron, prochlorperazine, senna-docusate 8.6-50 mg, simethicone, sodium chloride 0.9%    Objective:     Vital Signs (Most Recent):  Temp: 98.3 °F (36.8 °C) (07/27/22 1125)  Pulse: 90 (07/27/22 1125)  Resp: 18 (07/27/22 1125)  BP: 127/74 (07/27/22 1125)  SpO2: 100 % (07/27/22 1237) Vital Signs (24h Range):  Temp:  [96.6 °F (35.9 °C)-99.4 °F (37.4 °C)] 98.3 °F (36.8 °C)  Pulse:  [80-99] 90  Resp:  [16-20] 18  SpO2:  [97 %-100 %] 100 %  BP: ()/(53-74) 127/74     Weight: 44.5 kg (98 lb 1.7 oz)  Body mass index is 18.54 kg/m².    Physical Exam  Constitutional:       Appearance: She is ill-appearing (Chronically and acutely).      Comments: Temporal lobe wasting    Cardiovascular:      Rate and Rhythm: Normal rate.   Pulmonary:      Effort: No respiratory distress.   Neurological:      Mental Status: She is alert. She is disoriented.      Comments: Patient appears agitated       Review of Symptoms      Symptom Assessment (ESAS 0-10 Scale)  Unable to complete assessment due to Mental status change       Pain Assessment in Advanced Demential Scale (PAINAD)   Breathing - Independent of vocalization:  0  Negative vocalization:  1  Facial expression:  1  Body language:  1  Consolability:  0  Total:  3    Performance Status:  40    Living Arrangements:  Lives in nursing home      Advance Care Planning   Advance Directives:   Do Not Resuscitate Status: Yes      Decision Making:  Family answered questions       Significant Labs: All pertinent labs within the past 24 hours have been reviewed.  CBC:   Recent Labs   Lab 07/27/22  0453   WBC 15.60*   HGB 9.5*   HCT 29.4*   *   *     BMP:  Recent Labs    Lab 07/27/22  0453 07/27/22  0753   * 149*  149*   * 156*  156*   K 3.8 3.3*  3.3*   * 121*  121*   CO2 19* 21*  21*   BUN 64* 60*  60*   CREATININE 1.8* 1.7*  1.7*   CALCIUM 8.6* 8.7  8.7   MG 1.9  --      LFT:  Lab Results   Component Value Date    AST 28 07/27/2022    ALKPHOS 85 07/27/2022    BILITOT 0.4 07/27/2022     Albumin:   Albumin   Date Value Ref Range Status   07/27/2022 2.7 (L) 3.5 - 5.2 g/dL Final     Protein:   Total Protein   Date Value Ref Range Status   07/27/2022 5.9 (L) 6.0 - 8.4 g/dL Final     Lactic acid:   Lab Results   Component Value Date    LACTATE 2.3 (H) 07/26/2022    LACTATE 2.7 (H) 07/26/2022       Significant Imaging: I have reviewed all pertinent imaging results/findings within the past 24 hours.    Discussed with Dr. Bonilla and CM      > 50% of 35 min visit spent in chart review, face to face discussion of symptom assessment, coordination of care with other specialists, and d/c planning        Gina Alexandra DNP  Palliative Medicine  [facility] ([place])

## 2022-07-27 NOTE — SUBJECTIVE & OBJECTIVE
Interval History: was calm early this AM and repeated her name to me, has since been very agitated    Review of Systems   Unable to perform ROS: Dementia   Objective:     Vital Signs (Most Recent):  Temp: 98.3 °F (36.8 °C) (07/27/22 1125)  Pulse: 90 (07/27/22 1125)  Resp: 18 (07/27/22 1125)  BP: 127/74 (07/27/22 1125)  SpO2: 98 % (07/27/22 0757) Vital Signs (24h Range):  Temp:  [96.6 °F (35.9 °C)-99.4 °F (37.4 °C)] 98.3 °F (36.8 °C)  Pulse:  [80-99] 90  Resp:  [16-20] 18  SpO2:  [97 %-98 %] 98 %  BP: ()/(53-74) 127/74     Weight: 44.5 kg (98 lb 1.7 oz)  Body mass index is 18.54 kg/m².    Intake/Output Summary (Last 24 hours) at 7/27/2022 1152  Last data filed at 7/27/2022 1100  Gross per 24 hour   Intake 1160.96 ml   Output --   Net 1160.96 ml      Physical Exam  Vitals and nursing note reviewed.   Constitutional:       General: She is not in acute distress.     Appearance: She is well-developed. She is ill-appearing.      Comments: cachexia   HENT:      Head: Normocephalic.      Right Ear: External ear normal.      Left Ear: External ear normal.   Eyes:      Conjunctiva/sclera: Conjunctivae normal.      Pupils: Pupils are equal, round, and reactive to light.   Cardiovascular:      Rate and Rhythm: Normal rate and regular rhythm.      Heart sounds: Murmur heard.   Pulmonary:      Effort: Respiratory distress present.      Breath sounds: Normal breath sounds.   Abdominal:      General: Bowel sounds are normal.      Palpations: Abdomen is soft.      Tenderness: There is no abdominal tenderness.   Musculoskeletal:         General: Swelling and tenderness present. Normal range of motion.      Cervical back: Normal range of motion and neck supple.   Skin:     General: Skin is warm and dry.      Findings: Bruising present.   Neurological:      Mental Status: She is disoriented.      Motor: Weakness present.       Significant Labs: All pertinent labs within the past 24 hours have been reviewed.  BMP:   Recent Labs    Lab 07/27/22  0453 07/27/22  0753   * 149*  149*   * 156*  156*   K 3.8 3.3*  3.3*   * 121*  121*   CO2 19* 21*  21*   BUN 64* 60*  60*   CREATININE 1.8* 1.7*  1.7*   CALCIUM 8.6* 8.7  8.7   MG 1.9  --

## 2022-07-27 NOTE — ASSESSMENT & PLAN NOTE
Nutrition Problem:  Moderate/Severe Protein-Calorie Malnutrition  Malnutrition in the context of Chronic Illness/Injury    Related to (etiology):  AMS (dementia)     Signs and Symptoms (as evidenced by):  Energy Intake: <75% of estimated energy requirement for >/= 1 month  Body Fat Depletion: severe depletion of orbitals, triceps and thoracic and lumbar region   Muscle Mass Depletion: severe depletion of temples, clavicle region, scapular region, interosseous muscle and lower extremities     Interventions(treatment strategy):  Collaboration with other healthcare providers  Cardiac diet  ONS    Nutrition Diagnosis Status:  New

## 2022-07-27 NOTE — PLAN OF CARE
O2 flow down to 6 liters. Left ear lobe probes picks up O2 saturation if herrera are a bit patient with it.

## 2022-07-27 NOTE — ASSESSMENT & PLAN NOTE
"7/27/22  - I attempted to contact patients niece, Abbey, unable to reach her.   - I spoke to the patients nephew, Donis. He reports he was able to obtain POA documents and he is designated as financial POA but not healthcare, he reports there is no designation for HCPOA. Yesterday, Abbey, discussed hospice support with the RN. I discussed with Donis the current condition of the patient and that she appears very agitated and uncomfortable. We also discussed patients poor nutritional status and that she is not eating or drinking much. We discussed focusing on comfort and symptom management with hospice support. Donis reports he has spoke to the patients niece and they desire NH with hospice.   - Would recommend low dose ativan for agitation   - CM to arrange NH with hospice.     7/26/22  - Consult placed for advance care planning/ goals of care in elderly, frail patient admitted with respiratory failure. She was recently diagnosed with COVID. When I met the patient, she was lying in bed, appeared SOB, was taking oxygen off. She was able to state her name, however kept saying "help me, help me".  - I contacted [facility], she has been a resident there for a long time. She does not have a HCPOA on file, however the patients nephew, Donis, has stated he is HCPOA but has not provided documents yet.   - I contacted Donis, he is the patients nephew. The patient has been a resident at [facility] for several years. She has no children. She has several nieces and nephews. Donis does not live in [place]. We discussed the patients current condition and that her prognosis is guarded, discussed she is having rectal bleeding and is on HFNC. He is planning to come in to town on Thursday or Friday. His care preferences for Ms. Ratliff are to focus on acute/ reversible conditions. I did discuss with him that the patient is very frail and elderly and she may decline pretty quickly, he is aware of this. He reiterated the " patients wishes for DNR/ DNI. Donis Kellie ([phone]) reports he is HCPOA, the paperwork is at his office and he will fax it to me when it is available. Of note, Donis reports [place] Paul is the patients ex nephew, he was  to the patients niece. However, [place] lives locally and has been very active in helping to care for Ms. Ratliff.   - I attempted to contact patients niece, Abbey, unable to reach her.  - I contacted [place] Paul. We discussed patients current plan of care and that prognosis is guarded. He asked if he should have family visit, I portrayed my transparent concern that Ms. Ratliff status is very guarded and she may decline quickly and if visiting is important to family then I would advise this. [place] understands that the patient is very frail and elderly and very acutely ill. [place] reiterated that Donis if HCPOA  - For now, continue current level of care, patient DNR/ DNI. Family is aware of patients guarded prognosis and if patients status acutely changes, please reach out to family immediately.

## 2022-07-27 NOTE — PROGRESS NOTES
Pharmacokinetic Assessment Follow Up: IV Vancomycin    Vancomycin serum concentration assessment(s):    The random level was drawn correctly and can be used to guide therapy at this time. The measurement is within the desired definitive target range of 10 to 20 mcg/mL.    Vancomycin Regimen Plan:    Re-dose when the random level is less than 20 mcg/mL, next level to be drawn at 2100 on 7/27/22    Drug levels (last 3 results):  Recent Labs   Lab Result Units 07/26/22  2040   Vancomycin, Random ug/mL 12.7       Pharmacy will continue to follow and monitor vancomycin.    Please contact pharmacy at extension [phone] for questions regarding this assessment.    Thank you for the consult,   Ben Peguero       Patient brief summary:  Abbey Ratliff is a 89 y.o. female initiated on antimicrobial therapy with IV Vancomycin for treatment of sepsis    The patient's current regimen is pulse dosing    Drug Allergies:   Review of patient's allergies indicates:  No Known Allergies    Actual Body Weight:   44.5kg    Renal Function:   Estimated Creatinine Clearance: 9.7 mL/min (A) (based on SCr of 2 mg/dL (H)).,     Dialysis Method (if applicable):  N/A    CBC (last 72 hours):  Recent Labs   Lab Result Units 07/25/22  1832 07/26/22  0404   WBC K/uL 21.74* 15.76*   Hemoglobin g/dL 12.6 9.3*   Hematocrit % 40.8 30.0*   Platelets K/uL 206 156   Gran % % 93.4* 91.0*   Lymph % % 2.9* 5.3*   Mono % % 2.9* 3.2*   Eosinophil % % 0.0 0.0   Basophil % % 0.3 0.2   Differential Method  Automated Automated       Metabolic Panel (last 72 hours):  Recent Labs   Lab Result Units 07/25/22 2001 07/25/22  2041 07/26/22  0404 07/26/22  1609   Sodium mmol/L 156*  --  154* 156*   Potassium mmol/L 4.9  --  4.1 3.3*   Chloride mmol/L 125*  --  125* 122*   CO2 mmol/L 11*  --  16* 21*   Glucose mg/dL 154*  --  181* 154*   Glucose, UA   --  Negative  --   --    BUN mg/dL 109*  --  93* 80*   Creatinine mg/dL 2.7*  --  2.2* 2.0*   Albumin g/dL 3.6  --  2.6*   --    Total Bilirubin mg/dL 0.5  --  0.4  --    Alkaline Phosphatase U/L 86  --  63  --    AST U/L 37  --  26  --    ALT U/L 26  --  17  --    Magnesium mg/dL  --   --  2.0  --    Phosphorus mg/dL  --   --  4.2  --        Vancomycin Administrations:  vancomycin given in the last 96 hours                     vancomycin in dextrose 5 % 1 gram/250 mL IVPB 1,000 mg (mg) 1,000 mg New Bag 07/25/22 2042                    Microbiologic Results:  Microbiology Results (last 7 days)       Procedure Component Value Units Date/Time    Clostridium difficile EIA [[id number]] Collected: 07/26/22 0937    Order Status: Completed Specimen: Stool Updated: 07/26/22 2149     C. diff Antigen Negative     C difficile Toxins A+B, EIA Negative     Comment: Testing not recommended for children <24 months old.       Blood Culture #1 **CANNOT BE ORDERED STAT** [[id number]] Collected: 07/25/22 1658    Order Status: Completed Specimen: Blood Updated: 07/26/22 0915     Blood Culture, Routine No Growth to date    Urine culture [[id number]] Collected: 07/25/22 2041    Order Status: Sent Specimen: Urine, Catheterized Updated: 07/26/22 0849    Blood Culture #2 **CANNOT BE ORDERED STAT** [[id number]]     Order Status: No result Specimen: Blood

## 2022-07-27 NOTE — PROGRESS NOTES
07/27/2022 @ 9:47am- RSW attempted to meet with patient , RSW unable to complete visit due to medical staff present in patient room. RSW will follow up with patient at a later time.    TODD Gutierrez

## 2022-07-27 NOTE — PLAN OF CARE
Problem: SLP  Goal: SLP Goal  Description: 1. Pt will be able to consume mechanical soft solids with no overt s/s of airway threat or aspiration given caregiver assistance to monitor safe rate and volume of intake.   2. Pt will be able to consume 3-5ml amount of thin liquids via spoon with no overt s/s of airway threat or aspiration given caregiver assistance to monitor safe rate and volume of intake and initiate timely swallow.   7/27/2022 1551 by Lizbeth Maldonado CCC-SLP  Outcome: Ongoing, Progressing       SLP to continue to follow 4-6x/week

## 2022-07-27 NOTE — ASSESSMENT & PLAN NOTE
This patient does have evidence of infective focus  My overall impression is sepsis. Vital signs were reviewed and noted in progress note.  Antibiotics given-   Antibiotics (From admission, onward)            Start     Stop Route Frequency Ordered    07/27/22 2200  cefepime in dextrose 5 % 1 gram/50 mL IVPB 1 g         -- IV Every 24 hours (non-standard times) 07/27/22 0856        Cultures were taken-   Microbiology Results (last 7 days)     Procedure Component Value Units Date/Time    Urine culture [[id number]] Collected: 07/25/22 2041    Order Status: Completed Specimen: Urine, Catheterized Updated: 07/27/22 1112     Urine Culture, Routine No growth    Blood Culture #1 **CANNOT BE ORDERED STAT** [[id number]] Collected: 07/25/22 1658    Order Status: Completed Specimen: Blood Updated: 07/27/22 0613     Blood Culture, Routine No Growth to date      No Growth to date    Clostridium difficile EIA [[id number]] Collected: 07/26/22 0937    Order Status: Completed Specimen: Stool Updated: 07/26/22 2149     C. diff Antigen Negative     C difficile Toxins A+B, EIA Negative     Comment: Testing not recommended for children <24 months old.       Blood Culture #2 **CANNOT BE ORDERED STAT** [[id number]]     Order Status: No result Specimen: Blood         Latest lactate reviewed, they are-  Recent Labs   Lab 07/25/22  2335 07/26/22  0404 07/26/22  1609   LACTATE 2.8* 2.7* 2.3*       Organ dysfunction indicated by Acute kidney injury, Encephalopathy  and Acute respiratory failure  Source- suspect early pneumonia.  Patient with severe dehydration encephalopathy with metabolic acidosis and elevated lactate.    Stop Vanc today, no evidence on micro

## 2022-07-27 NOTE — SUBJECTIVE & OBJECTIVE
"Interval History: Lying in bed, confused, appears uncomfortable, yelling "help me"    Medications:  Continuous Infusions:  Scheduled Meds:   ceFEPime (MAXIPIME) IVPB  1 g Intravenous Q24H    pantoprazole  40 mg Intravenous Daily     PRN Meds:acetaminophen, albuterol-ipratropium, aluminum-magnesium hydroxide-simethicone, glucagon (human recombinant), glucose, glucose, HYDROcodone-acetaminophen, magnesium oxide, magnesium oxide, melatonin, naloxone, ondansetron, prochlorperazine, senna-docusate 8.6-50 mg, simethicone, sodium chloride 0.9%    Objective:     Vital Signs (Most Recent):  Temp: 98.3 °F (36.8 °C) (07/27/22 1125)  Pulse: 90 (07/27/22 1125)  Resp: 18 (07/27/22 1125)  BP: 127/74 (07/27/22 1125)  SpO2: 100 % (07/27/22 1237) Vital Signs (24h Range):  Temp:  [96.6 °F (35.9 °C)-99.4 °F (37.4 °C)] 98.3 °F (36.8 °C)  Pulse:  [80-99] 90  Resp:  [16-20] 18  SpO2:  [97 %-100 %] 100 %  BP: ()/(53-74) 127/74     Weight: 44.5 kg (98 lb 1.7 oz)  Body mass index is 18.54 kg/m².    Physical Exam  Constitutional:       Appearance: She is ill-appearing (Chronically and acutely).      Comments: Temporal lobe wasting    Cardiovascular:      Rate and Rhythm: Normal rate.   Pulmonary:      Effort: No respiratory distress.   Neurological:      Mental Status: She is alert. She is disoriented.      Comments: Patient appears agitated       Review of Symptoms      Symptom Assessment (ESAS 0-10 Scale)  Unable to complete assessment due to Mental status change       Pain Assessment in Advanced Demential Scale (PAINAD)   Breathing - Independent of vocalization:  0  Negative vocalization:  1  Facial expression:  1  Body language:  1  Consolability:  0  Total:  3    Performance Status:  40    Living Arrangements:  Lives in nursing home      Advance Care Planning   Advance Directives:   Do Not Resuscitate Status: Yes      Decision Making:  Family answered questions       Significant Labs: All pertinent labs within the past 24 hours " have been reviewed.  CBC:   Recent Labs   Lab 07/27/22  0453   WBC 15.60*   HGB 9.5*   HCT 29.4*   *   *     BMP:  Recent Labs   Lab 07/27/22  0453 07/27/22  0753   * 149*  149*   * 156*  156*   K 3.8 3.3*  3.3*   * 121*  121*   CO2 19* 21*  21*   BUN 64* 60*  60*   CREATININE 1.8* 1.7*  1.7*   CALCIUM 8.6* 8.7  8.7   MG 1.9  --      LFT:  Lab Results   Component Value Date    AST 28 07/27/2022    ALKPHOS 85 07/27/2022    BILITOT 0.4 07/27/2022     Albumin:   Albumin   Date Value Ref Range Status   07/27/2022 2.7 (L) 3.5 - 5.2 g/dL Final     Protein:   Total Protein   Date Value Ref Range Status   07/27/2022 5.9 (L) 6.0 - 8.4 g/dL Final     Lactic acid:   Lab Results   Component Value Date    LACTATE 2.3 (H) 07/26/2022    LACTATE 2.7 (H) 07/26/2022       Significant Imaging: I have reviewed all pertinent imaging results/findings within the past 24 hours.

## 2022-07-27 NOTE — PLAN OF CARE
Recommendations   1. Continue cardiac diet as tolerated.    2. Recommend Boost Plus TID and Boost Pudding TID as tolerated.   3.Encourage good PO intake.   4. RD to monitor wt changes, diet orders, ONS, labs, and tolerance.    Goals: Pt to receieve ONS by RD f/u.  Nutrition Goal Status: new  Communication of RD Recs:  (POC)

## 2022-07-27 NOTE — PLAN OF CARE
CM spoke with Jessica ([facility]) referral sent in [facility]. [facility] [place] was be speaking with family and setting up clinical assessment evaluation.

## 2022-07-27 NOTE — PROGRESS NOTES
[facility] ([facility])  Adult Nutrition  Progress Note    SUMMARY     Recommendations   1. Continue cardiac diet as tolerated.    2. Recommend Boost Plus TID and Boost Pudding TID as tolerated.   3.Encourage good PO intake.   4. RD to monitor wt changes, diet orders, ONS, labs, and tolerance.    Goals: Pt to receieve ONS by RD f/u.  Nutrition Goal Status: new  Communication of RD Recs:  (POC)    Assessment and Plan    Severe protein-calorie malnutrition in the context of chronic illness   Nutrition Problem:  Moderate/Severe Protein-Calorie Malnutrition  Malnutrition in the context of Chronic Illness/Injury    Related to (etiology):  AMS (dementia)     Signs and Symptoms (as evidenced by):  Energy Intake: <75% of estimated energy requirement for >/= 1 month  Body Fat Depletion: severe depletion of orbitals, triceps and thoracic and lumbar region   Muscle Mass Depletion: severe depletion of temples, clavicle region, scapular region, interosseous muscle and lower extremities     Interventions(treatment strategy):  Collaboration with other healthcare providers  Cardiac diet  ONS    Nutrition Diagnosis Status:  New    Malnutrition Assessment  Malnutrition Type: chronic illness  Energy Intake: severe energy intake  Energy Intake (Malnutrition): less than or equal to 75% for greater than or equal to 1 month   Orbital Region (Subcutaneous Fat Loss): severe depletion  Upper Arm Region (Subcutaneous Fat Loss): severe depletion  Thoracic and Lumbar Region: severe depletion   Restorationist Region (Muscle Loss): severe depletion  Clavicle Bone Region (Muscle Loss): severe depletion  Clavicle and Acromion Bone Region (Muscle Loss): severe depletion  Scapular Bone Region (Muscle Loss): severe depletion  Dorsal Hand (Muscle Loss): severe depletion  Patellar Region (Muscle Loss): severe depletion  Anterior Thigh Region (Muscle Loss): severe depletion  Posterior Calf Region (Muscle Loss): severe depletion   Subcutaneous Fat Loss  "(Final Summary): severe protein-calorie malnutrition  Muscle Loss Evaluation (Final Summary): severe protein-calorie malnutrition        Reason for Assessment    Reason For Assessment: identified at risk by screening criteria  Diagnosis:  (acute hypoxemic respiratory failure)  Relevant Medical History:  Past Medical History:   Diagnosis Date    Aortic insufficiency     Arthritis     Carotid artery occlusion     CKD (chronic kidney disease)     MI (myocardial infarction)        Interdisciplinary Rounds: attended  General Information Comments: 7/26- MST of 3- unsure of wt loss and decreased appetitie with poor PO intake. Has been agitated. Bedbound. Plan to d/c with hospice. Agitated- asking for help and wanting to leave. On cardiac diet- no ONS/ PO intake documented. Unable to determine wt loss.  NFPE not perfromed 2/2 agitation- appears sevelery malnoursihed.    Nutrition Discharge Planning: Pt to follow a general healthful diet as tolerated.    Nutrition Risk Screen    Nutrition Risk Screen: no indicators present    Nutrition/Diet History    Factors Affecting Nutritional Intake: impaired cognitive status/motor control, decreased appetite    Anthropometrics    Temp: 98.3 °F (36.8 °C)  Height Method: Estimated  Height: 5' 1" (154.9 cm)  Height (inches): 61 in  Weight Method: Bed Scale  Weight: 44.5 kg (98 lb 1.7 oz)  Weight (lb): 98.11 lb  Ideal Body Weight (IBW), Female: 105 lb  % Ideal Body Weight, Female (lb): 93.44 %  BMI (Calculated): 18.5  BMI Grade: 18.5-24.9 - normal     Lab/Procedures/Meds    Pertinent Labs Reviewed: reviewed  Pertinent Labs Comments: Na 156, K 3.3, Cl 121, BUN 60, Cr 1.7, glu 149, H/H 9.5/39.4, Jimi Lactate 2.3, , Troponin I 0.112  Pertinent Medications Reviewed: reviewed  Pertinent Medications Comments: cefepime, pantoprazole      Estimated/Assessed Needs    Weight Used For Calorie Calculations: 44.5 kg (98 lb 1.7 oz)  Energy Calorie Requirements (kcal): 2836-0600 kcal day " (30-40 kcal/kg severly malnoursihed)  Energy Need Method: Kcal/kg  Protein Requirements: 54-67 g day (1.2-1.5 g/kg severe malnoursihed)  Weight Used For Protein Calculations: 44.5 kg (98 lb 1.7 oz)  Estimated Fluid Requirement Method: RDA Method  RDA Method (mL): 1300  CHO Requirement: 163 g day    Nutrition Prescription Ordered    Current Diet Order: cardiac    Evaluation of Received Nutrient/Fluid Intake    I/O: +1.15L since admit  Energy Calories Required: not meeting needs  Protein Required: not meeting needs  % Intake of Estimated Energy Needs: 0 - 25 %  % Meal Intake: 0 - 25 %    Nutrition Risk    Level of Risk/Frequency of Follow-up:  (2 x weekly)     Monitor and Evaluation    Food and Nutrient Intake: food and beverage intake, energy intake  Food and Nutrient Adminstration: diet order  Knowledge/Beliefs/Attitudes: beliefs and attitudes, food and nutrition knowledge/skill  Physical Activity and Function: nutrition-related ADLs and IADLs  Anthropometric Measurements: weight, weight change, body mass index  Biochemical Data, Medical Tests and Procedures: glucose/endocrine profile, gastrointestinal profile, electrolyte and renal panel, inflammatory profile, lipid profile  Nutrition-Focused Physical Findings: overall appearance     Nutrition Follow-Up    RD Follow-up?: Yes

## 2022-07-27 NOTE — PROGRESS NOTES
[facility] Medicine  Progress Note    Patient Name: Abbey Ratliff  MRN: [id number]  Patient Class: IP- Inpatient   Admission Date: 7/25/2022  Length of Stay: 1 days  Attending Physician: Kaushal Bonilla MD  Primary Care Provider: [facility]        Subjective:     Principal Problem:Acute hypoxemic respiratory failure        HPI:  Abbey Ratliff is a 89 y.o. female with PMHx of carotid artery occlusion, aortic insufficiency, CKD, and prior MI who presents from her nursing home via EMS with  shortness of breath and altered mental status.  Upon arrival per EMS the patient's oxygen saturation was 60% and blood glucose 199. Patient was noncompliant with EMS and received 50 mg of ketamine IV.  Patient was diagnosed with COVID July 5th and tested negative for COVID a yesterday.  Patient presently is noted to have hypothermia with a rectal temp of 96° and persistent hypoxemia.  She is pulling off non-rebreather.  Staff reports patient had episode of diarrhea with bright red stool noted.   Laboratory data in the ED reveals significant evidence of dehydration with a sodium of 156 chloride 125 and CO2 11.  BUN creatinine 109 and 2.7 respectively.  Lactic acid 5.2.  WBC 21.74 Chest x-ray personally reviewed suspect early infiltrate.  Patient is noted to have encephalopathy and is only able to state name.  Information obtained from electronic health record and nursing home documents.        Overview/Hospital Course:  No notes on file    Interval History: was calm early this AM and repeated her name to me, has since been very agitated    Review of Systems   Unable to perform ROS: Dementia   Objective:     Vital Signs (Most Recent):  Temp: 98.3 °F (36.8 °C) (07/27/22 1125)  Pulse: 90 (07/27/22 1125)  Resp: 18 (07/27/22 1125)  BP: 127/74 (07/27/22 1125)  SpO2: 98 % (07/27/22 0757) Vital Signs (24h Range):  Temp:  [96.6 °F (35.9 °C)-99.4 °F (37.4 °C)] 98.3 °F (36.8 °C)  Pulse:  [80-99] 90  Resp:  [16-20]  18  SpO2:  [97 %-98 %] 98 %  BP: ()/(53-74) 127/74     Weight: 44.5 kg (98 lb 1.7 oz)  Body mass index is 18.54 kg/m².    Intake/Output Summary (Last 24 hours) at 7/27/2022 1152  Last data filed at 7/27/2022 1100  Gross per 24 hour   Intake 1160.96 ml   Output --   Net 1160.96 ml      Physical Exam  Vitals and nursing note reviewed.   Constitutional:       General: She is not in acute distress.     Appearance: She is well-developed. She is ill-appearing.      Comments: cachexia   HENT:      Head: Normocephalic.      Right Ear: External ear normal.      Left Ear: External ear normal.   Eyes:      Conjunctiva/sclera: Conjunctivae normal.      Pupils: Pupils are equal, round, and reactive to light.   Cardiovascular:      Rate and Rhythm: Normal rate and regular rhythm.      Heart sounds: Murmur heard.   Pulmonary:      Effort: Respiratory distress present.      Breath sounds: Normal breath sounds.   Abdominal:      General: Bowel sounds are normal.      Palpations: Abdomen is soft.      Tenderness: There is no abdominal tenderness.   Musculoskeletal:         General: Swelling and tenderness present. Normal range of motion.      Cervical back: Normal range of motion and neck supple.   Skin:     General: Skin is warm and dry.      Findings: Bruising present.   Neurological:      Mental Status: She is disoriented.      Motor: Weakness present.       Significant Labs: All pertinent labs within the past 24 hours have been reviewed.  BMP:   Recent Labs   Lab 07/27/22  0453 07/27/22  0753   * 149*  149*   * 156*  156*   K 3.8 3.3*  3.3*   * 121*  121*   CO2 19* 21*  21*   BUN 64* 60*  60*   CREATININE 1.8* 1.7*  1.7*   CALCIUM 8.6* 8.7  8.7   MG 1.9  --        Assessment/Plan:      * Acute hypoxemic respiratory failure  Improved    Patient does not want to be intubated. CPAP/BiPAP ok          Hypernatremia  D5 admin today and repeat BMPs. Goal Na of 150 by tomorrow morning      Acute GI  bleeding  Appreciate GI assistance, monitoring. No evidence of active bleeding      ACP (advance care planning)  Advance Care Planning     Date: 07/25/2022    [facility]  I engaged the family and healthcare power of   in a conversation about advance care planning and we specifically addressed what the goals of care would be moving forward, in light of the patient's change in clinical status, specifically patient with overall very poor prognosis with high mortality rate in the setting of severe metabolic acidosis, acute respiratory failure encephalopathy and acute kidney injury.  We did specifically address the patient's likely prognosis, which is poor.  We explored the patient's values and preferences for future care.  The family and healthcare power of   endorses that what is most important right now is to focus on spending time at home, avoiding the hospital and symptom/pain control    Accordingly, we have decided that the best plan to meet the patient's goals includes continuing with treatment    I did not explain the role for hospice care at this stage of the patient's illness, including its ability to help the patient live with the best quality of life possible.  We will not be making a hospice referral.    I spent a total of 30 minutes engaging the patient in this advance care planning discussion.    Discussed with [place] hewitt patient's prognosis is poor with high mortality rate.  Patient is with imminent death.  Discussed with nephew will initiate IV fluids, IV antibiotics, and BiPAP CPAP per patient's [place] post document.                Sepsis  This patient does have evidence of infective focus  My overall impression is sepsis. Vital signs were reviewed and noted in progress note.  Antibiotics given-   Antibiotics (From admission, onward)            Start     Stop Route Frequency Ordered    07/27/22 2200  cefepime in dextrose 5 % 1 gram/50 mL IVPB 1 g         -- IV Every 24 hours  (non-standard times) 07/27/22 0856        Cultures were taken-   Microbiology Results (last 7 days)     Procedure Component Value Units Date/Time    Urine culture [[id number]] Collected: 07/25/22 2041    Order Status: Completed Specimen: Urine, Catheterized Updated: 07/27/22 1112     Urine Culture, Routine No growth    Blood Culture #1 **CANNOT BE ORDERED STAT** [[id number]] Collected: 07/25/22 1658    Order Status: Completed Specimen: Blood Updated: 07/27/22 0613     Blood Culture, Routine No Growth to date      No Growth to date    Clostridium difficile EIA [[id number]] Collected: 07/26/22 0937    Order Status: Completed Specimen: Stool Updated: 07/26/22 2149     C. diff Antigen Negative     C difficile Toxins A+B, EIA Negative     Comment: Testing not recommended for children <24 months old.       Blood Culture #2 **CANNOT BE ORDERED STAT** [[id number]]     Order Status: No result Specimen: Blood         Latest lactate reviewed, they are-  Recent Labs   Lab 07/25/22  2335 07/26/22  0404 07/26/22  1609   LACTATE 2.8* 2.7* 2.3*       Organ dysfunction indicated by Acute kidney injury, Encephalopathy  and Acute respiratory failure  Source- suspect early pneumonia.  Patient with severe dehydration encephalopathy with metabolic acidosis and elevated lactate.    Stop Vanc today, no evidence on micro        Aspiration pneumonia  Suspected early aspiration pneumonia.       MICHAEL (acute kidney injury)  Resolving, still needs further hydration       Encephalopathy, metabolic  Appears that she may be approaching her baseline based on reports from her living facility      Coronary artery disease  Patient with known CAD s/p stent placement, which is controlled Will continue ASA, Plavix and Statin and monitor for S/Sx of angina/ACS. Continue to monitor on telemetry.   Hold aspirin and Plavix and all oral medications at this time due to current mental status and blood noted in stool.        VTE Risk Mitigation (From admission,  onward)         Ordered     IP VTE HIGH RISK PATIENT  Once         07/25/22 2017     Place sequential compression device  Until discontinued         07/25/22 2017                Discharge Planning   OLIVIA:      Code Status: DNR   Is the patient medically ready for discharge?:     Reason for patient still in hospital (select all that apply): Treatment  Discharge Plan A: Return to nursing home                  Kaushal Bonilla MD  Department of Hospital Medicine   [facility] ([place]

## 2022-07-28 LAB
ALBUMIN SERPL BCP-MCNC: 2.7 G/DL (ref 3.5–5.2)
ALP SERPL-CCNC: 93 U/L (ref 55–135)
ALT SERPL W/O P-5'-P-CCNC: 16 U/L (ref 10–44)
ANION GAP SERPL CALC-SCNC: 11 MMOL/L (ref 8–16)
ANION GAP SERPL CALC-SCNC: 11 MMOL/L (ref 8–16)
ANION GAP SERPL CALC-SCNC: 12 MMOL/L (ref 8–16)
ANION GAP SERPL CALC-SCNC: 12 MMOL/L (ref 8–16)
AST SERPL-CCNC: 20 U/L (ref 10–40)
BASOPHILS # BLD AUTO: 0.04 K/UL (ref 0–0.2)
BASOPHILS NFR BLD: 0.3 % (ref 0–1.9)
BILIRUB SERPL-MCNC: 0.5 MG/DL (ref 0.1–1)
BUN SERPL-MCNC: 36 MG/DL (ref 10–30)
BUN SERPL-MCNC: 44 MG/DL (ref 10–30)
BUN SERPL-MCNC: 45 MG/DL (ref 10–30)
BUN SERPL-MCNC: 47 MG/DL (ref 10–30)
CALCIUM SERPL-MCNC: 8.7 MG/DL (ref 8.7–10.5)
CALCIUM SERPL-MCNC: 9 MG/DL (ref 8.7–10.5)
CALCIUM SERPL-MCNC: 9 MG/DL (ref 8.7–10.5)
CALCIUM SERPL-MCNC: 9.1 MG/DL (ref 8.7–10.5)
CHLORIDE SERPL-SCNC: 119 MMOL/L (ref 95–110)
CHLORIDE SERPL-SCNC: 119 MMOL/L (ref 95–110)
CHLORIDE SERPL-SCNC: 120 MMOL/L (ref 95–110)
CHLORIDE SERPL-SCNC: 120 MMOL/L (ref 95–110)
CO2 SERPL-SCNC: 20 MMOL/L (ref 23–29)
CO2 SERPL-SCNC: 20 MMOL/L (ref 23–29)
CO2 SERPL-SCNC: 21 MMOL/L (ref 23–29)
CO2 SERPL-SCNC: 23 MMOL/L (ref 23–29)
CREAT SERPL-MCNC: 1.3 MG/DL (ref 0.5–1.4)
CREAT SERPL-MCNC: 1.4 MG/DL (ref 0.5–1.4)
CREAT SERPL-MCNC: 1.5 MG/DL (ref 0.5–1.4)
CREAT SERPL-MCNC: 1.5 MG/DL (ref 0.5–1.4)
DIFFERENTIAL METHOD: ABNORMAL
EOSINOPHIL # BLD AUTO: 0.1 K/UL (ref 0–0.5)
EOSINOPHIL NFR BLD: 0.8 % (ref 0–8)
ERYTHROCYTE [DISTWIDTH] IN BLOOD BY AUTOMATED COUNT: 14.7 % (ref 11.5–14.5)
EST. GFR  (AFRICAN AMERICAN): 32 ML/MIN/1.73 M^2
EST. GFR  (AFRICAN AMERICAN): 32 ML/MIN/1.73 M^2
EST. GFR  (AFRICAN AMERICAN): 35 ML/MIN/1.73 M^2
EST. GFR  (AFRICAN AMERICAN): 38 ML/MIN/1.73 M^2
EST. GFR  (NON AFRICAN AMERICAN): 28 ML/MIN/1.73 M^2
EST. GFR  (NON AFRICAN AMERICAN): 28 ML/MIN/1.73 M^2
EST. GFR  (NON AFRICAN AMERICAN): 30 ML/MIN/1.73 M^2
EST. GFR  (NON AFRICAN AMERICAN): 33 ML/MIN/1.73 M^2
GLUCOSE SERPL-MCNC: 115 MG/DL (ref 70–110)
GLUCOSE SERPL-MCNC: 126 MG/DL (ref 70–110)
GLUCOSE SERPL-MCNC: 134 MG/DL (ref 70–110)
GLUCOSE SERPL-MCNC: 147 MG/DL (ref 70–110)
HCT VFR BLD AUTO: 31.1 % (ref 37–48.5)
HGB BLD-MCNC: 10 G/DL (ref 12–16)
IMM GRANULOCYTES # BLD AUTO: 0.18 K/UL (ref 0–0.04)
IMM GRANULOCYTES NFR BLD AUTO: 1.3 % (ref 0–0.5)
LYMPHOCYTES # BLD AUTO: 1.1 K/UL (ref 1–4.8)
LYMPHOCYTES NFR BLD: 7.4 % (ref 18–48)
MAGNESIUM SERPL-MCNC: 1.8 MG/DL (ref 1.6–2.6)
MCH RBC QN AUTO: 32.5 PG (ref 27–31)
MCHC RBC AUTO-ENTMCNC: 32.2 G/DL (ref 32–36)
MCV RBC AUTO: 101 FL (ref 82–98)
MONOCYTES # BLD AUTO: 0.6 K/UL (ref 0.3–1)
MONOCYTES NFR BLD: 3.9 % (ref 4–15)
NEUTROPHILS # BLD AUTO: 12.3 K/UL (ref 1.8–7.7)
NEUTROPHILS NFR BLD: 86.3 % (ref 38–73)
NRBC BLD-RTO: 0 /100 WBC
PHOSPHATE SERPL-MCNC: 2.5 MG/DL (ref 2.7–4.5)
PLATELET # BLD AUTO: 124 K/UL (ref 150–450)
PMV BLD AUTO: 14 FL (ref 9.2–12.9)
POTASSIUM SERPL-SCNC: 2.9 MMOL/L (ref 3.5–5.1)
POTASSIUM SERPL-SCNC: 3.2 MMOL/L (ref 3.5–5.1)
POTASSIUM SERPL-SCNC: 3.4 MMOL/L (ref 3.5–5.1)
POTASSIUM SERPL-SCNC: 3.4 MMOL/L (ref 3.5–5.1)
PROT SERPL-MCNC: 6.3 G/DL (ref 6–8.4)
RBC # BLD AUTO: 3.08 M/UL (ref 4–5.4)
SODIUM SERPL-SCNC: 150 MMOL/L (ref 136–145)
SODIUM SERPL-SCNC: 151 MMOL/L (ref 136–145)
SODIUM SERPL-SCNC: 153 MMOL/L (ref 136–145)
SODIUM SERPL-SCNC: 154 MMOL/L (ref 136–145)
WBC # BLD AUTO: 14.27 K/UL (ref 3.9–12.7)

## 2022-07-28 PROCEDURE — 36415 COLL VENOUS BLD VENIPUNCTURE: CPT | Performed by: NURSE PRACTITIONER

## 2022-07-28 PROCEDURE — 80048 BASIC METABOLIC PNL TOTAL CA: CPT | Mod: 91,XB | Performed by: INTERNAL MEDICINE

## 2022-07-28 PROCEDURE — 80048 BASIC METABOLIC PNL TOTAL CA: CPT | Mod: XB | Performed by: INTERNAL MEDICINE

## 2022-07-28 PROCEDURE — 85025 COMPLETE CBC W/AUTO DIFF WBC: CPT | Performed by: NURSE PRACTITIONER

## 2022-07-28 PROCEDURE — 92526 ORAL FUNCTION THERAPY: CPT

## 2022-07-28 PROCEDURE — 80053 COMPREHEN METABOLIC PANEL: CPT | Performed by: NURSE PRACTITIONER

## 2022-07-28 PROCEDURE — C9113 INJ PANTOPRAZOLE SODIUM, VIA: HCPCS | Performed by: INTERNAL MEDICINE

## 2022-07-28 PROCEDURE — 63600175 PHARM REV CODE 636 W HCPCS: Performed by: INTERNAL MEDICINE

## 2022-07-28 PROCEDURE — 11000001 HC ACUTE MED/SURG PRIVATE ROOM

## 2022-07-28 PROCEDURE — 25000003 PHARM REV CODE 250: Performed by: INTERNAL MEDICINE

## 2022-07-28 PROCEDURE — 84100 ASSAY OF PHOSPHORUS: CPT | Performed by: NURSE PRACTITIONER

## 2022-07-28 PROCEDURE — 83735 ASSAY OF MAGNESIUM: CPT | Performed by: NURSE PRACTITIONER

## 2022-07-28 PROCEDURE — 36415 COLL VENOUS BLD VENIPUNCTURE: CPT | Performed by: INTERNAL MEDICINE

## 2022-07-28 PROCEDURE — 94761 N-INVAS EAR/PLS OXIMETRY MLT: CPT

## 2022-07-28 RX ORDER — DEXTROSE MONOHYDRATE 50 MG/ML
INJECTION, SOLUTION INTRAVENOUS CONTINUOUS
Status: ACTIVE | OUTPATIENT
Start: 2022-07-28 | End: 2022-07-29

## 2022-07-28 RX ORDER — LOPERAMIDE HCL 1MG/7.5ML
2 LIQUID (ML) ORAL 4 TIMES DAILY PRN
Status: DISCONTINUED | OUTPATIENT
Start: 2022-07-28 | End: 2022-07-29 | Stop reason: HOSPADM

## 2022-07-28 RX ORDER — POTASSIUM CHLORIDE 7.45 MG/ML
10 INJECTION INTRAVENOUS
Status: COMPLETED | OUTPATIENT
Start: 2022-07-28 | End: 2022-07-28

## 2022-07-28 RX ORDER — NITROGLYCERIN 40 MG/1
1 PATCH TRANSDERMAL DAILY
Status: DISCONTINUED | OUTPATIENT
Start: 2022-07-29 | End: 2022-07-28

## 2022-07-28 RX ORDER — DIAZEPAM 10 MG/2ML
2.5 INJECTION INTRAMUSCULAR EVERY 8 HOURS PRN
Status: DISCONTINUED | OUTPATIENT
Start: 2022-07-28 | End: 2022-07-29 | Stop reason: HOSPADM

## 2022-07-28 RX ORDER — NITROGLYCERIN 40 MG/1
1 PATCH TRANSDERMAL DAILY
Status: DISCONTINUED | OUTPATIENT
Start: 2022-07-28 | End: 2022-07-29 | Stop reason: HOSPADM

## 2022-07-28 RX ORDER — DEXTROSE MONOHYDRATE 50 MG/ML
INJECTION, SOLUTION INTRAVENOUS ONCE
Status: COMPLETED | OUTPATIENT
Start: 2022-07-28 | End: 2022-07-28

## 2022-07-28 RX ADMIN — POTASSIUM CHLORIDE 10 MEQ: 7.46 INJECTION, SOLUTION INTRAVENOUS at 02:07

## 2022-07-28 RX ADMIN — PANTOPRAZOLE SODIUM 40 MG: 40 INJECTION, POWDER, FOR SOLUTION INTRAVENOUS at 10:07

## 2022-07-28 RX ADMIN — POTASSIUM CHLORIDE 10 MEQ: 7.46 INJECTION, SOLUTION INTRAVENOUS at 12:07

## 2022-07-28 RX ADMIN — DEXTROSE: 5 SOLUTION INTRAVENOUS at 06:07

## 2022-07-28 RX ADMIN — DEXTROSE: 5 SOLUTION INTRAVENOUS at 08:07

## 2022-07-28 RX ADMIN — CEFEPIME HYDROCHLORIDE 1 G: 1 INJECTION, SOLUTION INTRAVENOUS at 03:07

## 2022-07-28 RX ADMIN — NITROGLYCERIN 1 PATCH: 0.2 PATCH TRANSDERMAL at 05:07

## 2022-07-28 RX ADMIN — POTASSIUM CHLORIDE 10 MEQ: 7.46 INJECTION, SOLUTION INTRAVENOUS at 05:07

## 2022-07-28 NOTE — PROGRESS NOTES
[facility] Medicine  Progress Note    Patient Name: Abbey Ratliff  MRN: [id number]  Patient Class: IP- Inpatient   Admission Date: 7/25/2022  Length of Stay: 2 days  Attending Physician: Kaushal Bonilla MD  Primary Care Provider: [facility]        Subjective:     Principal Problem:Acute hypoxemic respiratory failure        HPI:  Abbey Ratliff is a 89 y.o. female with PMHx of carotid artery occlusion, aortic insufficiency, CKD, and prior MI who presents from her nursing home via EMS with  shortness of breath and altered mental status.  Upon arrival per EMS the patient's oxygen saturation was 60% and blood glucose 199. Patient was noncompliant with EMS and received 50 mg of ketamine IV.  Patient was diagnosed with COVID July 5th and tested negative for COVID a yesterday.  Patient presently is noted to have hypothermia with a rectal temp of 96° and persistent hypoxemia.  She is pulling off non-rebreather.  Staff reports patient had episode of diarrhea with bright red stool noted.   Laboratory data in the ED reveals significant evidence of dehydration with a sodium of 156 chloride 125 and CO2 11.  BUN creatinine 109 and 2.7 respectively.  Lactic acid 5.2.  WBC 21.74 Chest x-ray personally reviewed suspect early infiltrate.  Patient is noted to have encephalopathy and is only able to state name.  Information obtained from electronic health record and nursing home documents.        Overview/Hospital Course:  No notes on file    Interval History: a bit more alert today, still disoriented    Review of Systems   Unable to perform ROS: Mental status change   Objective:     Vital Signs (Most Recent):  Temp: 97.8 °F (36.6 °C) (07/28/22 1609)  Pulse: 91 (07/28/22 1609)  Resp: 20 (07/28/22 1609)  BP: (!) 181/93 (07/28/22 1609)  SpO2: (!) 94 % (07/28/22 1149)   Vital Signs (24h Range):  Temp:  [97 °F (36.1 °C)-98.7 °F (37.1 °C)] 97.8 °F (36.6 °C)  Pulse:  [] 91  Resp:  [18-20] 20  SpO2:  [85 %-98  %] 94 %  BP: (127-181)/(60-93) 181/93     Weight: 44.5 kg (98 lb 1.7 oz)  Body mass index is 18.54 kg/m².    Intake/Output Summary (Last 24 hours) at 7/28/2022 1621  Last data filed at 7/28/2022 0717  Gross per 24 hour   Intake 1828.09 ml   Output --   Net 1828.09 ml      Physical Exam  Vitals and nursing note reviewed.   Constitutional:       General: She is not in acute distress.     Appearance: She is well-developed. She is ill-appearing.      Comments: cachexia   HENT:      Head: Normocephalic.      Right Ear: External ear normal.      Left Ear: External ear normal.   Eyes:      Conjunctiva/sclera: Conjunctivae normal.      Pupils: Pupils are equal, round, and reactive to light.   Cardiovascular:      Rate and Rhythm: Normal rate and regular rhythm.      Heart sounds: Murmur heard.   Pulmonary:      Effort: Respiratory distress present.      Breath sounds: Normal breath sounds.   Abdominal:      General: Bowel sounds are normal.      Palpations: Abdomen is soft.      Tenderness: There is no abdominal tenderness.   Musculoskeletal:         General: Swelling and tenderness present. Normal range of motion.      Cervical back: Normal range of motion and neck supple.   Skin:     General: Skin is warm and dry.      Findings: Bruising present.   Neurological:      Mental Status: She is disoriented.      Motor: Weakness present.       Significant Labs: All pertinent labs within the past 24 hours have been reviewed.  BMP:   Recent Labs   Lab 07/28/22  0454 07/28/22  0811   * 147*   * 154*   K 3.4* 2.9*   * 119*   CO2 21* 23   BUN 45* 44*   CREATININE 1.5* 1.4   CALCIUM 9.1 9.0   MG 1.8  --            Assessment/Plan:      * Acute hypoxemic respiratory failure  Improved    Patient does not want to be intubated. CPAP/BiPAP ok          Hypernatremia  D5 admin today and repeat BMPs. Goal Na of 146 by tomorrow morning      Acute GI bleeding  Appreciate GI assistance, monitoring. No evidence of active  bleeding      ACP (advance care planning)  Advance Care Planning     Date: 07/25/2022    [facility]  I engaged the family and healthcare power of   in a conversation about advance care planning and we specifically addressed what the goals of care would be moving forward, in light of the patient's change in clinical status, specifically patient with overall very poor prognosis with high mortality rate in the setting of severe metabolic acidosis, acute respiratory failure encephalopathy and acute kidney injury.  We did specifically address the patient's likely prognosis, which is poor.  We explored the patient's values and preferences for future care.  The family and healthcare power of   endorses that what is most important right now is to focus on spending time at home, avoiding the hospital and symptom/pain control    Accordingly, we have decided that the best plan to meet the patient's goals includes continuing with treatment    I did not explain the role for hospice care at this stage of the patient's illness, including its ability to help the patient live with the best quality of life possible.  We will not be making a hospice referral.    I spent a total of 30 minutes engaging the patient in this advance care planning discussion.    Discussed with Chico hewitt patient's prognosis is poor with high mortality rate.  Patient is with imminent death.  Discussed with nephew will initiate IV fluids, IV antibiotics, and BiPAP CPAP per patient's [place] post document.                Sepsis  This patient does have evidence of infective focus  My overall impression is sepsis. Vital signs were reviewed and noted in progress note.  Antibiotics given-   Antibiotics (From admission, onward)            Start     Stop Route Frequency Ordered    07/27/22 2200  cefepime in dextrose 5 % 1 gram/50 mL IVPB 1 g         -- IV Every 24 hours (non-standard times) 07/27/22 0856        Cultures were taken-   Microbiology  Results (last 7 days)     Procedure Component Value Units Date/Time    Urine culture [[id number]] Collected: 07/25/22 2041    Order Status: Completed Specimen: Urine, Catheterized Updated: 07/27/22 1112     Urine Culture, Routine No growth    Blood Culture #1 **CANNOT BE ORDERED STAT** [[id number]] Collected: 07/25/22 1658    Order Status: Completed Specimen: Blood Updated: 07/27/22 0613     Blood Culture, Routine No Growth to date      No Growth to date    Clostridium difficile EIA [[id number]] Collected: 07/26/22 0937    Order Status: Completed Specimen: Stool Updated: 07/26/22 2149     C. diff Antigen Negative     C difficile Toxins A+B, EIA Negative     Comment: Testing not recommended for children <24 months old.       Blood Culture #2 **CANNOT BE ORDERED STAT** [[id number]]     Order Status: No result Specimen: Blood         Latest lactate reviewed, they are-  Recent Labs   Lab 07/25/22  2335 07/26/22  0404 07/26/22  1609   LACTATE 2.8* 2.7* 2.3*       Organ dysfunction indicated by Acute kidney injury, Encephalopathy  and Acute respiratory failure  Source- suspect early pneumonia.  Patient with severe dehydration encephalopathy with metabolic acidosis and elevated lactate.    Stop Vanc today, no evidence on micro        Aspiration pneumonia  Suspected early aspiration pneumonia.       MICHAEL (acute kidney injury)  Resolving, still needs further hydration       Encephalopathy, metabolic  Appears that she may be approaching her baseline based on reports from her living facility      Coronary artery disease  Patient with known CAD s/p stent placement, which is controlled Will continue ASA, Plavix and Statin and monitor for S/Sx of angina/ACS. Continue to monitor on telemetry.   Hold aspirin and Plavix and all oral medications at this time due to current mental status and blood noted in stool.        VTE Risk Mitigation (From admission, onward)         Ordered     IP VTE HIGH RISK PATIENT  Once         07/25/22 2017      Place sequential compression device  Until discontinued         07/25/22 2017                Discharge Planning   OLIVIA:      Code Status: DNR   Is the patient medically ready for discharge?:     Reason for patient still in hospital (select all that apply): Treatment  Discharge Plan A: Return to nursing home                  Kaushal Bonilla MD  Department of Hospital Medicine   [facility]

## 2022-07-28 NOTE — PROGRESS NOTES
IP Liaison - Initial Visit Note    Patient: Abbey Ratliff  MRN:  [id number]  Date of Service:  7/28/2022  Completed by:  TODD Gutierrez    Reason for Visit   Patient presents with    IP Liaison Initial Visit       RSW called nursing home over the phone in order to complete SDOH questionnaire and liaison assessment.  Pt has no identified barriers to care. Patient will return to nursing home upon discharge.   The following were addressed during this visit:  - Review SDOH Questions   - Complete patient assessment   - Complete initial visit with patient        Patient Summary     IP Liaison Patient Assessment    General  Level of Caregiver support: Caregiver currently provides assistance  Have you had to make a decision between paying for any of the following in the last 2 months?: None  Transportation means: Other  Employment status: Retired and not working  Current symptoms: Confusion, Memory problems  Assessments  Was the PHQ Depression Screening completed this visit?: Yes  Was the NAOMI-7 Screening completed this visit?: No       TODD Gutierrez

## 2022-07-28 NOTE — PT/OT/SLP PROGRESS
"Speech Language Pathology Treatment    Patient Name:  Abbey Ratliff   MRN:  [id number]  Admitting Diagnosis: Acute hypoxemic respiratory failure    Recommendations:                 General Recommendations: SLP to continue to follow for ongoing assessment of diet tolerance and s/s of aspiration      Diet recommendations: Solids: Mechanical soft, Finely chopped meat, Liquids: Thin      Aspiration Precautions: 1:1 assistance with meals     1. 3-5ml sips of thin liquids via spoon only  2. NO STRAWS  3. 1 bite/sip at a time  4. Assistance with meals  5. Double swallow with each bite/sip  6. Feed only when awake/alert  7. HOB to 90 degrees  8. Monitor for s/s of aspiration   9. Small bites/sips   10. Eliminate distractions while eating, do not feed if pt is unable to attend to oral feeding task safely      General Precautions: Standard, aspiration, fall     Subjective     · RN reports pt has tolerated minimal po intake, expelling foods and meds.   · Pt awake/alert, reclined in bed. SLP in this date for ongoing assessment of swallow function with lunch tray.    Respiratory Status: NC    Objective:     Has the patient been evaluated by SLP for swallowing?   Yes  Keep patient NPO? No   Current Respiratory Status:    NC    Cognitive Communication Status: Pt awake, ongoing confusional state. Pt highly distractible, required constant redirection to participate in meal. Pt perseverating on "i'm lost. I need to go home". Unable to attend to oral feeding task for longer than 5-10 seconds at a time. Pt requires caregiver to assure safety is maintained and wants and needs are met 100% of time.     Swallowing:  SLP in this date for assistance with lunch tray. Noted pt remained on a regular/thin diet. Notified RN. Diet changed to mechanical soft.     Oral Phase:   · dcr lip seal   · dcr cohesive bolus formation   · Excessive mastication across all consistencies, oral holding across all consistencies  · Pt expelled 3/6 mechanical soft " solid boluses  · Frequently talking while eating   · Unable to redirect pt during meal this date      Pharyngeal Phase:   · Trigger of pharyngeal swallow appears to be delayed   · Pt demonstrating inconsistent s/s of airway threat: inconsistently coughing across all consistencies during mastication/oral holding, as well as, following the swallow.  · Pt demonstrated dry, non-productive cough frequently throughout session.     Education: Discussed with RN on importance of adhering to aspiration precautions, including to not thicken liquids at this time. RN verbalized understanding.       Assessment:     Abbey Ratliff is a 89 y.o. female with an SLP diagnosis of dysphagia due to cognitive communication status. Discussed aspiration precautions with care team and for ongoing assessment of swallow function.     Goals:   Multidisciplinary Problems     SLP Goals        Problem: SLP    Goal Priority Disciplines Outcome   SLP Goal     SLP Ongoing, Progressing   Description: 1. Pt will be able to consume mechanical soft solids with no overt s/s of airway threat or aspiration given caregiver assistance to monitor safe rate and volume of intake.   2. Pt will be able to consume 3-5ml amount of thin liquids via spoon with no overt s/s of airway threat or aspiration given caregiver assistance to monitor safe rate and volume of intake and initiate timely swallow.                    Plan:     · Patient to be seen:  3 x/week, 5 x/week   · Plan of Care expires:  08/10/22  · Plan of Care reviewed with:  other (see comments) (RN, MD)   · SLP Follow-Up:  Yes       Discharge recommendations:  other (see comments) (family deciding nursing home with hospice care)       Time Tracking:     SLP Treatment Date:   07/28/22  Speech Start Time:  1157  Speech Stop Time:  1214     Speech Total Time (min):  17 min    Billable Minutes: Treatment Swallowing Dysfunction 17 min    07/28/2022

## 2022-07-28 NOTE — ASSESSMENT & PLAN NOTE
Advance Care Planning     Date: 07/25/2022    [facility]  I engaged the family and healthcare power of   in a conversation about advance care planning and we specifically addressed what the goals of care would be moving forward, in light of the patient's change in clinical status, specifically patient with overall very poor prognosis with high mortality rate in the setting of severe metabolic acidosis, acute respiratory failure encephalopathy and acute kidney injury.  We did specifically address the patient's likely prognosis, which is poor.  We explored the patient's values and preferences for future care.  The family and healthcare power of   endorses that what is most important right now is to focus on spending time at home, avoiding the hospital and symptom/pain control    Accordingly, we have decided that the best plan to meet the patient's goals includes continuing with treatment    I did not explain the role for hospice care at this stage of the patient's illness, including its ability to help the patient live with the best quality of life possible.  We will not be making a hospice referral.    I spent a total of 30 minutes engaging the patient in this advance care planning discussion.    Discussed with [place] hewitt patient's prognosis is poor with high mortality rate.  Patient is with imminent death.  Discussed with nephew will initiate IV fluids, IV antibiotics, and BiPAP CPAP per patient's [place] post document.

## 2022-07-28 NOTE — PLAN OF CARE
Pt remained free of falls and injuries throughout shift. Pt oriented to self, anxious and calling out, confused, wanting to get back to her bed, but pt is lying in bed. Unable to reorient. Purposeful hourly rounding performed. Pt drank a couple small sips of thickened water, otherwise not drinking or eating. Pt received IV cefepime as ordered, D5 infusion started this AM per MAR. Pt still having diarrhea, incontinence care provided. Weight shift assist provided q2h using pillow. Pt resting comfortably in bed, NADN, side rails up x3. Report given to EDDIE Story.

## 2022-07-28 NOTE — PROGRESS NOTES
This lady is in no distress though she is confused. She has no acute visceral signs or that of ischemia or cholangitis. She has loose greenish stool but no signs of blood loss. Her dietary intake is poor and no signs of nausea peritonitis or cholangitis.    T<100 HR 90  Anicteric minimal temporal wasting  Neck supple no mass  Heart RR no gallop  Chest few upper airway noises  Abdomen soft non tender active sounds no masses ascites or tympany  Extremities no cyanosis or clubbing  Neuro alert and confused    Labs reviewed, H/H higher    Continue supportive measures and push calories as tolerated. No plans for endoscopy.

## 2022-07-28 NOTE — SUBJECTIVE & OBJECTIVE
Interval History: a bit more alert today, still disoriented    Review of Systems   Unable to perform ROS: Mental status change   Objective:     Vital Signs (Most Recent):  Temp: 97.8 °F (36.6 °C) (07/28/22 1609)  Pulse: 91 (07/28/22 1609)  Resp: 20 (07/28/22 1609)  BP: (!) 181/93 (07/28/22 1609)  SpO2: (!) 94 % (07/28/22 1149)   Vital Signs (24h Range):  Temp:  [97 °F (36.1 °C)-98.7 °F (37.1 °C)] 97.8 °F (36.6 °C)  Pulse:  [] 91  Resp:  [18-20] 20  SpO2:  [85 %-98 %] 94 %  BP: (127-181)/(60-93) 181/93     Weight: 44.5 kg (98 lb 1.7 oz)  Body mass index is 18.54 kg/m².    Intake/Output Summary (Last 24 hours) at 7/28/2022 1621  Last data filed at 7/28/2022 0717  Gross per 24 hour   Intake 1828.09 ml   Output --   Net 1828.09 ml      Physical Exam  Vitals and nursing note reviewed.   Constitutional:       General: She is not in acute distress.     Appearance: She is well-developed. She is ill-appearing.      Comments: cachexia   HENT:      Head: Normocephalic.      Right Ear: External ear normal.      Left Ear: External ear normal.   Eyes:      Conjunctiva/sclera: Conjunctivae normal.      Pupils: Pupils are equal, round, and reactive to light.   Cardiovascular:      Rate and Rhythm: Normal rate and regular rhythm.      Heart sounds: Murmur heard.   Pulmonary:      Effort: Respiratory distress present.      Breath sounds: Normal breath sounds.   Abdominal:      General: Bowel sounds are normal.      Palpations: Abdomen is soft.      Tenderness: There is no abdominal tenderness.   Musculoskeletal:         General: Swelling and tenderness present. Normal range of motion.      Cervical back: Normal range of motion and neck supple.   Skin:     General: Skin is warm and dry.      Findings: Bruising present.   Neurological:      Mental Status: She is disoriented.      Motor: Weakness present.       Significant Labs: All pertinent labs within the past 24 hours have been reviewed.  BMP:   Recent Labs   Lab  07/28/22  0454 07/28/22  0811   * 147*   * 154*   K 3.4* 2.9*   * 119*   CO2 21* 23   BUN 45* 44*   CREATININE 1.5* 1.4   CALCIUM 9.1 9.0   MG 1.8  --

## 2022-07-28 NOTE — PLAN OF CARE
POC reviewed w/ pt and pt's family. VSS on 3.5 L NC. No complaints/ indications of pain. Turn Q2/frequent weight shift assistance provided. Instructed to call for help ambulating. No injuries, falls, or trauma occurred during shift. Purposeful rounding completed. Bed alarm set, bed low and locked, side rails up x3, call light within reach.

## 2022-07-29 ENCOUNTER — PATIENT OUTREACH (OUTPATIENT)
Dept: ADMINISTRATIVE | Facility: OTHER | Age: 87
End: 2022-07-29
Payer: MEDICARE

## 2022-07-29 VITALS
OXYGEN SATURATION: 98 % | SYSTOLIC BLOOD PRESSURE: 131 MMHG | HEART RATE: 88 BPM | RESPIRATION RATE: 16 BRPM | WEIGHT: 98.13 LBS | TEMPERATURE: 98 F | HEIGHT: 61 IN | DIASTOLIC BLOOD PRESSURE: 77 MMHG | BODY MASS INDEX: 18.53 KG/M2

## 2022-07-29 PROBLEM — J69.0 ASPIRATION PNEUMONIA: Status: RESOLVED | Noted: 2022-07-25 | Resolved: 2022-07-29

## 2022-07-29 PROBLEM — J96.01 ACUTE HYPOXEMIC RESPIRATORY FAILURE: Status: RESOLVED | Noted: 2022-07-25 | Resolved: 2022-07-29

## 2022-07-29 PROBLEM — E87.0 HYPERNATREMIA: Status: RESOLVED | Noted: 2022-07-26 | Resolved: 2022-07-29

## 2022-07-29 PROBLEM — A41.9 SEPSIS: Status: RESOLVED | Noted: 2022-07-25 | Resolved: 2022-07-29

## 2022-07-29 PROBLEM — N17.9 AKI (ACUTE KIDNEY INJURY): Status: RESOLVED | Noted: 2022-07-25 | Resolved: 2022-07-29

## 2022-07-29 PROBLEM — K92.2 ACUTE GI BLEEDING: Status: RESOLVED | Noted: 2022-07-26 | Resolved: 2022-07-29

## 2022-07-29 PROBLEM — F03.90 DEMENTIA WITHOUT BEHAVIORAL DISTURBANCE: Status: ACTIVE | Noted: 2022-07-29

## 2022-07-29 LAB
ANION GAP SERPL CALC-SCNC: 11 MMOL/L (ref 8–16)
ANION GAP SERPL CALC-SCNC: 12 MMOL/L (ref 8–16)
BUN SERPL-MCNC: 30 MG/DL (ref 10–30)
BUN SERPL-MCNC: 33 MG/DL (ref 10–30)
CALCIUM SERPL-MCNC: 8.6 MG/DL (ref 8.7–10.5)
CALCIUM SERPL-MCNC: 9.1 MG/DL (ref 8.7–10.5)
CHLORIDE SERPL-SCNC: 115 MMOL/L (ref 95–110)
CHLORIDE SERPL-SCNC: 118 MMOL/L (ref 95–110)
CO2 SERPL-SCNC: 19 MMOL/L (ref 23–29)
CO2 SERPL-SCNC: 23 MMOL/L (ref 23–29)
CREAT SERPL-MCNC: 1.2 MG/DL (ref 0.5–1.4)
CREAT SERPL-MCNC: 1.2 MG/DL (ref 0.5–1.4)
EST. GFR  (AFRICAN AMERICAN): 42 ML/MIN/1.73 M^2
EST. GFR  (AFRICAN AMERICAN): 42 ML/MIN/1.73 M^2
EST. GFR  (NON AFRICAN AMERICAN): 36 ML/MIN/1.73 M^2
EST. GFR  (NON AFRICAN AMERICAN): 36 ML/MIN/1.73 M^2
GLUCOSE SERPL-MCNC: 140 MG/DL (ref 70–110)
GLUCOSE SERPL-MCNC: 144 MG/DL (ref 70–110)
POTASSIUM SERPL-SCNC: 3.4 MMOL/L (ref 3.5–5.1)
POTASSIUM SERPL-SCNC: 3.4 MMOL/L (ref 3.5–5.1)
SODIUM SERPL-SCNC: 149 MMOL/L (ref 136–145)
SODIUM SERPL-SCNC: 149 MMOL/L (ref 136–145)

## 2022-07-29 PROCEDURE — 94761 N-INVAS EAR/PLS OXIMETRY MLT: CPT

## 2022-07-29 PROCEDURE — 25000003 PHARM REV CODE 250: Performed by: INTERNAL MEDICINE

## 2022-07-29 PROCEDURE — 25000242 PHARM REV CODE 250 ALT 637 W/ HCPCS: Performed by: NURSE PRACTITIONER

## 2022-07-29 PROCEDURE — 80048 BASIC METABOLIC PNL TOTAL CA: CPT | Performed by: INTERNAL MEDICINE

## 2022-07-29 PROCEDURE — 80048 BASIC METABOLIC PNL TOTAL CA: CPT | Mod: 91 | Performed by: INTERNAL MEDICINE

## 2022-07-29 PROCEDURE — 36415 COLL VENOUS BLD VENIPUNCTURE: CPT | Performed by: INTERNAL MEDICINE

## 2022-07-29 PROCEDURE — 92526 ORAL FUNCTION THERAPY: CPT

## 2022-07-29 PROCEDURE — 63600175 PHARM REV CODE 636 W HCPCS: Performed by: INTERNAL MEDICINE

## 2022-07-29 PROCEDURE — 99233 PR SUBSEQUENT HOSPITAL CARE,LEVL III: ICD-10-PCS | Mod: 95,,, | Performed by: FAMILY MEDICINE

## 2022-07-29 PROCEDURE — 94640 AIRWAY INHALATION TREATMENT: CPT

## 2022-07-29 PROCEDURE — C9113 INJ PANTOPRAZOLE SODIUM, VIA: HCPCS | Performed by: INTERNAL MEDICINE

## 2022-07-29 PROCEDURE — 27000221 HC OXYGEN, UP TO 24 HOURS

## 2022-07-29 PROCEDURE — 99900035 HC TECH TIME PER 15 MIN (STAT)

## 2022-07-29 PROCEDURE — 99233 SBSQ HOSP IP/OBS HIGH 50: CPT | Mod: 95,,, | Performed by: FAMILY MEDICINE

## 2022-07-29 RX ORDER — HYDROCODONE BITARTRATE AND ACETAMINOPHEN 5; 325 MG/1; MG/1
1 TABLET ORAL EVERY 6 HOURS PRN
Qty: 120 TABLET | Refills: 0 | Status: SHIPPED | OUTPATIENT
Start: 2022-07-29

## 2022-07-29 RX ADMIN — IPRATROPIUM BROMIDE AND ALBUTEROL SULFATE 3 ML: 2.5; .5 SOLUTION RESPIRATORY (INHALATION) at 08:07

## 2022-07-29 RX ADMIN — CEFEPIME HYDROCHLORIDE 1 G: 1 INJECTION, SOLUTION INTRAVENOUS at 02:07

## 2022-07-29 RX ADMIN — PANTOPRAZOLE SODIUM 40 MG: 40 INJECTION, POWDER, FOR SOLUTION INTRAVENOUS at 09:07

## 2022-07-29 RX ADMIN — NITROGLYCERIN 1 PATCH: 0.2 PATCH TRANSDERMAL at 09:07

## 2022-07-29 RX ADMIN — DEXTROSE: 5 SOLUTION INTRAVENOUS at 01:07

## 2022-07-29 NOTE — PLAN OF CARE
[facility]  Department of Hospital Medicine  [address] [place] [address]  [phone] after hours  [phone] fax    HOSPICE  ORDERS    07/29/2022    Admit to Hospice:  Nursing Home Service     Diagnoses:   Active Hospital Problems    Diagnosis  POA    *Acute hypoxemic respiratory failure [J96.01]  Yes    Severe protein-calorie malnutrition in the context of chronic illness  [E43]  Yes     Chronic    Acute GI bleeding [K92.2]  Clinically Undetermined    Hypernatremia [E87.0]  Yes    Encephalopathy, metabolic [G93.41]  Yes    MICHAEL (acute kidney injury) [N17.9]  Yes    Aspiration pneumonia [J69.0]  Yes    Sepsis [A41.9]  Yes    ACP (advance care planning) [Z71.89]  Not Applicable    Coronary artery disease [I25.10]  Yes      Resolved Hospital Problems   No resolved problems to display.       Hospice Qualifying Diagnoses:        Patient has a life expectancy < 6 months due to:  1) Primary Hospice Diagnosis:  Dementia, failure to thrive as an adult  2) Comorbid Conditions Contributing to Decline:  CKD, carotid occlusion    Vital Signs: Routine per Hospice Protocol.    Code Status: DNR    Allergies: Review of patient's allergies indicates:  No Known Allergies    Diet: Dyphagia mechanical soft (IDDSI Level 5) with pleasure feeds as per family    Activities: As tolerated    Goals of Care Treatment Preferences:  Code Status: DNR    Nursing: Per Hospice Routine.      Routine Skin for Bedridden Patients: Apply moisture barrier cream to all skin folds and   wet areas in perineal area daily and after baths and all bowel movements.    Oxygen: as needed for comfort        Medications:        Medication List      CONTINUE taking these medications    docusate sodium 100 MG capsule  Commonly known as: COLACE  Take 100 mg by mouth once daily.     HYDROcodone-acetaminophen 5-325 mg per tablet  Commonly known as: NORCO  Take 1 tablet by mouth 2 (two) times daily as needed for  Pain.        STOP taking these medications    aspirin 81 MG Chew     clopidogreL 75 mg tablet  Commonly known as: PLAVIX     isosorbide mononitrate 30 MG 24 hr tablet  Commonly known as: IMDUR     metoprolol succinate 25 MG 24 hr tablet  Commonly known as: TOPROL-XL            Future Orders:  Hospice Medical Director may dictate new orders for comfortable care measures & sign death certificate.        _________________________________  Kaushal Bonilla MD  07/29/2022

## 2022-07-29 NOTE — PLAN OF CARE
Pt remained free of falls and injuries throughout shift. Pt oriented to self, calm and cooperative most of the shift, confused. Unable to reorient. Purposeful hourly rounding performed. Pt drank a couple small sips of thickened water, otherwise not drinking or eating. Pt received IV cefepime as ordered, D5 infusion D/C'd this AM per MAR. No diarrhea this AM. incontinence care provided. Weight shift assist provided q2h using pillow. Pt resting comfortably in bed, NADN, side rails up x3. Report given to RASTA Verdugo.

## 2022-07-29 NOTE — PROGRESS NOTES
This lady is clinically stable and her diarrhea is nearly resolved. No signs of blood loss cholangitis or visceral ischemia. She is more responsive though confused. Of concern is nutrition and hopefully this will improve with time.    T<100 HR 80  Anicteric mild temporal wasting  Neck supple no mass  Heart RR no gallop  Chest few upper noises no wheeze  Abdomen soft active sounds no masses ascites or tenderness  Extremities no cyanosis or clubbing  Neuro awake, confused    Labs as reported    Impression: This lady may have had a gastroenteritis which is resolved. She has no signs of blood loss and no peritoneal signs. Not certain she will experience dramatic improvements going forward though nutritional factors will need follow on return to her residence. Discharge today is reasonable.

## 2022-07-29 NOTE — DISCHARGE SUMMARY
[facility] Medicine  Discharge Summary      Patient Name: Abbey Ratliff  MRN: [id number]  Patient Class: IP- Inpatient  Admission Date: 7/25/2022  Hospital Length of Stay: 3 days  Discharge Date and Time:  07/29/2022 3:23 PM  Attending Physician: Kaushal Bonilla MD  Discharging Provider: Kaushal Bonilla MD  Primary Care Provider: [facility]      HPI:   Abbey Ratliff is a 89 y.o. female with PMHx of carotid artery occlusion, aortic insufficiency, CKD, and prior MI who presents from her nursing home via EMS with  shortness of breath and altered mental status.  Upon arrival per EMS the patient's oxygen saturation was 60% and blood glucose 199. Patient was noncompliant with EMS and received 50 mg of ketamine IV.  Patient was diagnosed with COVID July 5th and tested negative for COVID a yesterday.  Patient presently is noted to have hypothermia with a rectal temp of 96° and persistent hypoxemia.  She is pulling off non-rebreather.  Staff reports patient had episode of diarrhea with bright red stool noted.   Laboratory data in the ED reveals significant evidence of dehydration with a sodium of 156 chloride 125 and CO2 11.  BUN creatinine 109 and 2.7 respectively.  Lactic acid 5.2.  WBC 21.74 Chest x-ray personally reviewed suspect early infiltrate.  Patient is noted to have encephalopathy and is only able to state name.  Information obtained from electronic health record and nursing home documents.        * No surgery found *      Hospital Course:   Patient was admitted with severe hypernatremia, aspiration pneumonia and MICHAEL. She was treated with hypotonic fluids and her Na was corrected, broad spectrum Abx for her aspiration pneumonia. Long discussions were had with the family about her current trajectory and likely continued decline driven by her inability to keep herself hydrated via PO intake. Palliative was consulted and after a family meeting it was decided to transition the patient to  hospice care and return her to the nursing home under care of hospice medical director       Goals of Care Treatment Preferences:  Code Status: DNR          What is most important right now is to focus on spending time at home, avoiding the hospital, symptom/pain control.  Accordingly, we have decided that the best plan to meet the patient's goals includes continuing with treatment.      Consults:   Consults (From admission, onward)        Status Ordering Provider     Inpatient consult to Gastroenterology  Once        Provider:  Max Davila MD    Completed MILO SAENZ     Inpatient consult to Palliative Care  Once        Provider:  Gina Alexandra DNP    Completed MILO SAENZ     IP consult to case management  Once        Provider:  (Not yet assigned)    Completed ATA MINOR          No new Assessment & Plan notes have been filed under this hospital service since the last note was generated.  Service: Hospital Medicine    Final Active Diagnoses:    Diagnosis Date Noted POA    Dementia without behavioral disturbance [F03.90] 07/29/2022 Yes    Severe protein-calorie malnutrition in the context of chronic illness  [E43] 07/27/2022 Yes     Chronic    Encephalopathy, metabolic [G93.41] 07/25/2022 Yes    ACP (advance care planning) [Z71.89] 07/25/2022 Not Applicable    Coronary artery disease [I25.10] 12/04/2015 Yes      Problems Resolved During this Admission:    Diagnosis Date Noted Date Resolved POA    PRINCIPAL PROBLEM:  Acute hypoxemic respiratory failure [J96.01] 07/25/2022 07/29/2022 Yes    Acute GI bleeding [K92.2] 07/26/2022 07/29/2022 Clinically Undetermined    Hypernatremia [E87.0] 07/26/2022 07/29/2022 Yes    MICHAEL (acute kidney injury) [N17.9] 07/25/2022 07/29/2022 Yes    Aspiration pneumonia [J69.0] 07/25/2022 07/29/2022 Yes    Sepsis [A41.9] 07/25/2022 07/29/2022 Yes       Discharged Condition: poor    Disposition: Hospice/Home    Follow Up: Hospice medical director  asap    Patient Instructions:      Diet Dysphagia Mechanical Soft   Order Comments: With pleasure feedings as per family wishes     Activity as tolerated       Significant Diagnostic Studies: Labs:   BMP:   Recent Labs   Lab 07/28/22  0454 07/28/22  0811 07/28/22  1738 07/29/22  0021 07/29/22  0810   *   < > 134* 144* 140*   *   < > 150* 149* 149*   K 3.4*   < > 3.4* 3.4* 3.4*   *   < > 119* 118* 115*   CO2 21*   < > 20* 19* 23   BUN 45*   < > 36* 33* 30   CREATININE 1.5*   < > 1.3 1.2 1.2   CALCIUM 9.1   < > 8.7 8.6* 9.1   MG 1.8  --   --   --   --     < > = values in this interval not displayed.       Pending Diagnostic Studies:     Procedure Component Value Units Date/Time    Basic metabolic panel [[id number]]     Order Status: Sent Lab Status: No result     Specimen: Blood          Medications:  Reconciled Home Medications:      Medication List      CHANGE how you take these medications    HYDROcodone-acetaminophen 5-325 mg per tablet  Commonly known as: NORCO  Take 1 tablet by mouth every 6 (six) hours as needed for Pain.  What changed: when to take this        CONTINUE taking these medications    docusate sodium 100 MG capsule  Commonly known as: COLACE  Take 100 mg by mouth once daily.        STOP taking these medications    aspirin 81 MG Chew     clopidogreL 75 mg tablet  Commonly known as: PLAVIX     isosorbide mononitrate 30 MG 24 hr tablet  Commonly known as: IMDUR     metoprolol succinate 25 MG 24 hr tablet  Commonly known as: TOPROL-XL            Indwelling Lines/Drains at time of discharge:   Lines/Drains/Airways     None                 Time spent on the discharge of patient: 41 minutes         Kaushal Bonilla MD  Department of Hospital Medicine  [facility])

## 2022-07-29 NOTE — SUBJECTIVE & OBJECTIVE
Interval History: Does not appear agitated. Lying in bed, reports feeling okay.     Medications:  Continuous Infusions:  Scheduled Meds:   ceFEPime (MAXIPIME) IVPB  1 g Intravenous Q24H    nitroGLYCERIN 0.2 mg/hr TD PT24  1 patch Transdermal Daily    pantoprazole  40 mg Intravenous Daily     PRN Meds:acetaminophen, albuterol-ipratropium, aluminum-magnesium hydroxide-simethicone, diazePAM, glucagon (human recombinant), glucose, glucose, HYDROcodone-acetaminophen, loperamide, magnesium oxide, magnesium oxide, melatonin, naloxone, ondansetron, prochlorperazine, senna-docusate 8.6-50 mg, simethicone, sodium chloride 0.9%    Objective:     Vital Signs (Most Recent):  Temp: 97.8 °F (36.6 °C) (07/29/22 1126)  Pulse: 88 (07/29/22 1126)  Resp: 16 (07/29/22 1126)  BP: 131/77 (07/29/22 1126)  SpO2: 98 % (07/29/22 1126) Vital Signs (24h Range):  Temp:  [97.1 °F (36.2 °C)-98.2 °F (36.8 °C)] 97.8 °F (36.6 °C)  Pulse:  [76-91] 88  Resp:  [16-24] 16  SpO2:  [91 %-100 %] 98 %  BP: (131-186)/(66-93) 131/77     Weight: 44.5 kg (98 lb 1.7 oz)  Body mass index is 18.54 kg/m².       Physical Exam  Constitutional:       Appearance: She is ill-appearing (Chronically and acutely).      Comments: Temporal lobe wasting    Cardiovascular:      Rate and Rhythm: Normal rate.   Pulmonary:      Effort: No respiratory distress.   Neurological:      Mental Status: She is alert. She is disoriented.         Review of Symptoms        Symptom Assessment (ESAS 0-10 Scale)  Unable to complete assessment due to Mental status change         Pain Assessment in Advanced Demential Scale (PAINAD)   Breathing - Independent of vocalization:  0  Negative vocalization:  1  Facial expression:  1  Body language:  1  Consolability:  0  Total:  3     Performance Status:  40     Living Arrangements:  Lives in nursing home        Advance Care Planning   Advance Directives:   Do Not Resuscitate Status: Yes       Decision Making:  Family answered questions           Significant Labs: All pertinent labs within the past 24 hours have been reviewed.  CBC:   Recent Labs   Lab 07/28/22  0454   WBC 14.27*   HGB 10.0*   HCT 31.1*   *   *     BMP:  Recent Labs   Lab 07/29/22  0810   *   *   K 3.4*   *   CO2 23   BUN 30   CREATININE 1.2   CALCIUM 9.1     LFT:  Lab Results   Component Value Date    AST 20 07/28/2022    ALKPHOS 93 07/28/2022    BILITOT 0.5 07/28/2022     Albumin:   Albumin   Date Value Ref Range Status   07/28/2022 2.7 (L) 3.5 - 5.2 g/dL Final     Protein:   Total Protein   Date Value Ref Range Status   07/28/2022 6.3 6.0 - 8.4 g/dL Final     Lactic acid:   Lab Results   Component Value Date    LACTATE 2.3 (H) 07/26/2022    LACTATE 2.7 (H) 07/26/2022       Significant Imaging: I have reviewed all pertinent imaging results/findings within the past 24 hours.

## 2022-07-29 NOTE — PLAN OF CARE
Patient is a current resident of [facility]. Has been accepted back and will have home hospice upon arrival. All required paperwork has been emailed to [facility]. Family notified of departure and [facility] notified and will assume care when arrives at facility.       07/29/22 1205   Final Note   Assessment Type Final Discharge Note   Anticipated Discharge Disposition HospiceHome   Post-Acute Status   Discharge Delays None known at this time   [facility] ([facility])  Discharge Final Note    Primary Care Provider: [facility]    Expected Discharge Date: 7/29/2022    Final Discharge Note (most recent)       Final Note - 07/29/22 1205          Final Note    Assessment Type Final Discharge Note (P)      Anticipated Discharge Disposition Hospice/Home (P)         Post-Acute Status    Discharge Delays None known at this time (P)                      Important Message from Medicare

## 2022-07-29 NOTE — ASSESSMENT & PLAN NOTE
"7/29/22  - Patient lying in bed, appears comfortable. Discussed with patient she will return to NH today, patient seemed to be pleased with that. Dr. Bonilla met with several family members yesterday regarding disposition. They wish for patient to return to NH with hospice  - I spoke to Jessica from [facility] and Mary at [facility]. The patient will return to the NH today. The NH will do their own assessment of the patient and then call in [facility]. I discussed with Mary at length my concern that patient needs to be enrolled in hospice todayor she will return to the hospital. Mary stated Ms. Ratliff would be enrolled in hospice today. Per Jessica, [facility] will have a nurse to admit the patient after 3 PM.     7/27/22  - I attempted to contact patients niradha, Abbey, unable to reach her.   - I spoke to the patients nephew, Donis. He reports he was able to obtain POA documents and he is designated as financial POA but not healthcare, he reports there is no designation for HCPOA. Yesterday, Abbey, discussed hospice support with the RN. I discussed with Donis the current condition of the patient and that she appears very agitated and uncomfortable. We also discussed patients poor nutritional status and that she is not eating or drinking much. We discussed focusing on comfort and symptom management with hospice support. Donis reports he has spoke to the patients hui and they desire NH with hospice.   - Would recommend low dose ativan for agitation   - CM to arrange NH with hospice.     7/26/22  - Consult placed for advance care planning/ goals of care in elderly, frail patient admitted with respiratory failure. She was recently diagnosed with COVID. When I met the patient, she was lying in bed, appeared SOB, was taking oxygen off. She was able to state her name, however kept saying "help me, help me".  - I contacted [facility], she has been a resident there for a long time. She does not " have a HCPOA on file, however the patients nephew, Donis, has stated he is HCPOA but has not provided documents yet.   - I contacted Donis, he is the patients nephew. The patient has been a resident at [facility] for several years. She has no children. She has several nieces and nephews. Donis does not live in [place]. We discussed the patients current condition and that her prognosis is guarded, discussed she is having rectal bleeding and is on HFNC. He is planning to come in to town on Thursday or Friday. His care preferences for Ms. Ratliff are to focus on acute/ reversible conditions. I did discuss with him that the patient is very frail and elderly and she may decline pretty quickly, he is aware of this. He reiterated the patients wishes for DNR/ DNI. Donis Hopkins ([phone]) reports he is HCPOA, the paperwork is at his office and he will fax it to me when it is available. Of note, Donis reports Chico Paul is the patients ex nephew, he was  to the patients niece. However, Chico lives locally and has been very active in helping to care for Ms. Ratliff.   - I attempted to contact patients niece, Abbey, unable to reach her.  - I contacted [place]ico Paul. We discussed patients current plan of care and that prognosis is guarded. He asked if he should have family visit, I portrayed my transparent concern that Ms. Ratliff status is very guarded and she may decline quickly and if visiting is important to family then I would advise this. Chico understands that the patient is very frail and elderly and very acutely ill. Ch[place] reiterated that Donis if HCPOA  - For now, continue current level of care, patient DNR/ DNI. Family is aware of patients guarded prognosis and if patients status acutely changes, please reach out to family immediately.

## 2022-07-29 NOTE — PROGRESS NOTES
IP Liaison - Final Visit Note    Patient: Abbey Ratliff  MRN:  [id number]  Date of Service:  7/29/2022  Completed by:  TODD Gutierrez    Reason for Visit   Patient presents with    IP Liaison Follow-up Visit       The following were addressed during this visit:  - Complete follow-up visit with patient      Patient returning to [facility] with [facility] care.    Patient Summary     Discharge Date: 7/29/2022  Discharge telephone number/address: [phone] [place] [address]  Follow up provider: n/a  Follow up appointments:  n/a  Home Health agency & telephone number:  n/a  DME ordered &  name:  n/a  Assigned OPCM RN/SW:  n/a  Report sent to follow up team (PCP/OPCM) via in basket message:  n/a  Community Resources arranged including agency name & contact info: none      TODD Gutierrez

## 2022-07-29 NOTE — HOSPITAL COURSE
Patient was admitted with severe hypernatremia, aspiration pneumonia and MICHAEL. She was treated with hypotonic fluids and her Na was corrected, broad spectrum Abx for her aspiration pneumonia. Long discussions were had with the family about her current trajectory and likely continued decline driven by her inability to keep herself hydrated via PO intake. Palliative was consulted and after a family meeting it was decided to transition the patient to hospice care and return her to the nursing home under care of hospice medical director

## 2022-07-29 NOTE — PT/OT/SLP PROGRESS
"Speech Language Pathology Treatment    Patient Name:  Abbey Ratliff   MRN:  [id number]  Admitting Diagnosis: Acute hypoxemic respiratory failure    Recommendations:                 General Recommendations: SLP to continue to follow for ongoing assessment of diet tolerance and s/s of aspiration      Diet recommendations: Solids: Mechanical soft, Finely chopped meat, Liquids: Thin      Aspiration Precautions: 1:1 assistance with meals     1. 3-5ml sips of thin liquids via spoon only  2. NO STRAWS  3. 1 bite/sip at a time  4. Assistance with meals  5. Double swallow with each bite/sip  6. Feed only when awake/alert  7. HOB to 90 degrees  8. Monitor for s/s of aspiration   9. Small bites/sips   10. Eliminate distractions while eating, do not feed if pt is unable to attend to oral feeding task safely      General Precautions: Standard, aspiration, fall     Subjective     · RN reports pt able to tolerate small amounts of breakfast tray this AM.   · Pt awake/alert, sitting upright in bed.     Respiratory Status: NC    Objective:     Has the patient been evaluated by SLP for swallowing?   Yes  Keep patient NPO? No   Current Respiratory Status:    NC    Cognitive Communication Status: Pt awake, ongoing confusional state. Pt highly distractible, required constant redirection to participate in meal. Pt perseverating on "Help me". Unable to attend to oral feeding task for longer than 5-10 seconds at a time. Pt requires caregiver to assure safety is maintained and wants and needs are met 100% of time.     Swallowing:  SLP in this date for assistance with lunch tray.     Oral Phase:   · Functional labial seal, no anterior spillage this date  · dcr cohesive bolus formation   · Excessive mastication across all consistencies  · No instances of oral holding this date  · Frequently talking while eating   · Difficult to redirect pt during meal     Pharyngeal Phase:   · Trigger of pharyngeal swallow appears to be delayed   · No overt s/s of " aspiration or airway threat this date with all consistencies- no coughing, choking, changes in breath stream or vocal quality. Significant improvement compared to previous sessions.    Education: No family at bedside. Continue current diet recs, following strict aspiration precautions.       Assessment:     Abbey Ratliff is a 89 y.o. female with an SLP diagnosis of dysphagia due to cognitive communication status. Discussed aspiration precautions with care team and for ongoing assessment of swallow function.     Goals:   Multidisciplinary Problems     SLP Goals        Problem: SLP    Goal Priority Disciplines Outcome   SLP Goal     SLP Ongoing, Progressing   Description: 1. Pt will be able to consume mechanical soft solids with no overt s/s of airway threat or aspiration given caregiver assistance to monitor safe rate and volume of intake.   2. Pt will be able to consume 3-5ml amount of thin liquids via spoon with no overt s/s of airway threat or aspiration given caregiver assistance to monitor safe rate and volume of intake and initiate timely swallow.                    Plan:     · Patient to be seen:  3 x/week, 5 x/week   · Plan of Care expires:  08/10/22  · Plan of Care reviewed with:  other (see comments) (RN, MD)   · SLP Follow-Up:  Yes       Discharge recommendations:  other (see comments) (family deciding nursing home with hospice care)       Time Tracking:     SLP Treatment Date:   07/29/22  Speech Start Time:  1155  Speech Stop Time:  1207     Speech Total Time (min):  12 min    Billable Minutes: Treatment Swallowing Dysfunction 12 min      07/29/2022

## 2022-07-31 LAB — BACTERIA BLD CULT: NORMAL
